# Patient Record
Sex: FEMALE | Race: WHITE | NOT HISPANIC OR LATINO | Employment: FULL TIME | URBAN - METROPOLITAN AREA
[De-identification: names, ages, dates, MRNs, and addresses within clinical notes are randomized per-mention and may not be internally consistent; named-entity substitution may affect disease eponyms.]

---

## 2017-06-02 ENCOUNTER — HOSPITAL ENCOUNTER (EMERGENCY)
Facility: HOSPITAL | Age: 52
Discharge: HOME | End: 2017-06-02
Attending: EMERGENCY MEDICINE | Admitting: EMERGENCY MEDICINE

## 2017-06-02 ENCOUNTER — APPOINTMENT (OUTPATIENT)
Dept: GENERAL RADIOLOGY | Facility: HOSPITAL | Age: 52
End: 2017-06-02

## 2017-06-02 VITALS
DIASTOLIC BLOOD PRESSURE: 88 MMHG | HEIGHT: 68 IN | OXYGEN SATURATION: 98 % | TEMPERATURE: 98.3 F | SYSTOLIC BLOOD PRESSURE: 132 MMHG | HEART RATE: 88 BPM | BODY MASS INDEX: 22.73 KG/M2 | RESPIRATION RATE: 20 BRPM | WEIGHT: 150 LBS

## 2017-06-02 DIAGNOSIS — T14.8XXA ABRASION: ICD-10-CM
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
## 2017-06-02 DIAGNOSIS — S20.211A CHEST WALL CONTUSION, RIGHT, INITIAL ENCOUNTER: Primary | ICD-10-CM

## 2017-06-02 PROCEDURE — 99283 EMERGENCY DEPT VISIT LOW MDM: CPT

## 2017-06-02 PROCEDURE — 71020 HC CHEST PA AND LATERAL: CPT

## 2017-06-02 RX ORDER — ONDANSETRON 4 MG/1
4 TABLET, ORALLY DISINTEGRATING ORAL ONCE
Status: COMPLETED | OUTPATIENT
Start: 2017-06-02 | End: 2017-06-02

## 2017-06-02 RX ORDER — HYDROCODONE BITARTRATE AND ACETAMINOPHEN 7.5; 325 MG/1; MG/1
1 TABLET ORAL ONCE
Status: COMPLETED | OUTPATIENT
Start: 2017-06-02 | End: 2017-06-02

## 2017-06-02 RX ORDER — HYDROCODONE BITARTRATE AND ACETAMINOPHEN 5; 325 MG/1; MG/1
1-2 TABLET ORAL EVERY 6 HOURS PRN
Qty: 15 TABLET | Refills: 0 | Status: SHIPPED | OUTPATIENT
Start: 2017-06-02 | End: 2021-11-22

## 2017-06-02 RX ORDER — ONDANSETRON 4 MG/1
4 TABLET, FILM COATED ORAL EVERY 6 HOURS PRN
Qty: 12 TABLET | Refills: 0 | Status: SHIPPED | OUTPATIENT
Start: 2017-06-02 | End: 2021-11-22

## 2017-06-02 RX ADMIN — ONDANSETRON 4 MG: 4 TABLET, ORALLY DISINTEGRATING ORAL at 19:26

## 2017-06-02 RX ADMIN — HYDROCODONE BITARTRATE AND ACETAMINOPHEN 1 TABLET: 7.5; 325 TABLET ORAL at 19:25

## 2017-06-02 NOTE — ED PROVIDER NOTES
Subjective   HPI Comments: Trip and fall today. Landed on right shoulder / back    Patient is a 51 y.o. female presenting with chest pain.   Chest Pain   Pain quality: aching    Pain radiates to:  Does not radiate  Onset quality:  Gradual  Timing:  Constant  Progression:  Improving  Chronicity:  New  Context: breathing and movement    Worsened by:  Movement  Associated symptoms: no abdominal pain, no cough, no dizziness, no fever, no lower extremity edema and no shortness of breath        Review of Systems   Constitutional: Negative for fever.   Respiratory: Negative for cough and shortness of breath.    Cardiovascular: Positive for chest pain.   Gastrointestinal: Negative for abdominal pain.   Neurological: Negative for dizziness.   All other systems reviewed and are negative.      History reviewed. No pertinent past medical history.    No Known Allergies    Past Surgical History:   Procedure Laterality Date   •  SECTION     • DENTAL PROCEDURE         History reviewed. No pertinent family history.    Social History     Social History   • Marital status:      Spouse name: N/A   • Number of children: N/A   • Years of education: N/A     Social History Main Topics   • Smoking status: Never Smoker   • Smokeless tobacco: None   • Alcohol use No   • Drug use: No   • Sexual activity: Defer     Other Topics Concern   • None     Social History Narrative   • None           Objective   Physical Exam   Constitutional: She is oriented to person, place, and time. She appears well-developed and well-nourished.   HENT:   Head: Normocephalic and atraumatic.   Right Ear: External ear normal.   Left Ear: External ear normal.   Nose: Nose normal.   Mouth/Throat: Oropharynx is clear and moist.   Eyes: Conjunctivae and EOM are normal. Pupils are equal, round, and reactive to light.   Neck: Normal range of motion. Neck supple.   Cardiovascular: Normal rate, regular rhythm, normal heart sounds and intact distal pulses.     Pulmonary/Chest: Effort normal and breath sounds normal.       Abdominal: Soft. Bowel sounds are normal.   Musculoskeletal: Normal range of motion.   Neurological: She is alert and oriented to person, place, and time.   Skin: Skin is warm and dry.   Psychiatric: She has a normal mood and affect. Her behavior is normal. Judgment and thought content normal.       Procedures         ED Course  ED Course   Comment By Time   Well aware of the ss of worsening condition. All thankful and agreeable. Reviewed CXR with pt and family. MARIANO Vaz 06/02 1904                  [facility]    Final diagnoses:   Chest wall contusion, right, initial encounter   Abrasion   Fall, initial encounter            MARIANO Vaz  06/02/17 1907       MARIANO Vaz  06/02/17 1911

## 2021-11-22 ENCOUNTER — OFFICE VISIT (OUTPATIENT)
Dept: FAMILY MEDICINE CLINIC | Facility: CLINIC | Age: 56
End: 2021-11-22

## 2021-11-22 VITALS
DIASTOLIC BLOOD PRESSURE: 78 MMHG | BODY MASS INDEX: 24.93 KG/M2 | HEART RATE: 69 BPM | WEIGHT: 164.5 LBS | HEIGHT: 68 IN | SYSTOLIC BLOOD PRESSURE: 124 MMHG | OXYGEN SATURATION: 98 %

## 2021-11-22 DIAGNOSIS — F41.9 ANXIETY: ICD-10-CM

## 2021-11-22 DIAGNOSIS — M79.671 CHRONIC FOOT PAIN, RIGHT: ICD-10-CM

## 2021-11-22 DIAGNOSIS — Z78.0 MENOPAUSE: ICD-10-CM

## 2021-11-22 DIAGNOSIS — Z00.00 HEALTHCARE MAINTENANCE: ICD-10-CM

## 2021-11-22 DIAGNOSIS — Z11.59 ENCOUNTER FOR HEPATITIS C SCREENING TEST FOR LOW RISK PATIENT: ICD-10-CM

## 2021-11-22 DIAGNOSIS — Z12.11 COLON CANCER SCREENING: ICD-10-CM

## 2021-11-22 DIAGNOSIS — E55.9 VITAMIN D DEFICIENCY: ICD-10-CM

## 2021-11-22 DIAGNOSIS — M21.612 BILATERAL BUNIONS: Primary | ICD-10-CM

## 2021-11-22 DIAGNOSIS — G89.29 CHRONIC FOOT PAIN, RIGHT: ICD-10-CM

## 2021-11-22 DIAGNOSIS — Z12.31 ENCOUNTER FOR SCREENING MAMMOGRAM FOR MALIGNANT NEOPLASM OF BREAST: ICD-10-CM

## 2021-11-22 DIAGNOSIS — M21.611 BILATERAL BUNIONS: Primary | ICD-10-CM

## 2021-11-22 PROCEDURE — 99203 OFFICE O/P NEW LOW 30 MIN: CPT | Performed by: NURSE PRACTITIONER

## 2021-11-22 NOTE — PROGRESS NOTES
"Subjective   Jocelyn Yoon is a 56 y.o. female here to establish care.  Chief Complaint   Patient presents with   • Bunions     Bilateral. Pt states has been having some issues with her bunions on her feet and is wanting to see if she can be referred to a poditary office       History of Present Illness   Patient is here to establish care; complaint of bunions on both great toes; has had for years; right is worse than left, also pain on dorsal mid foot right  Patient has not had a colonoscopy, is willing to do the cologuard  Sees Dr. Pitts for hormone therapy, she used to be patient's PCP, but no longer doing primary care  Both parents had dementia, mother still living; diagnosed a few years ago. Patient denies memory problems, but does have concerns due to parents' history of dementia  The following portions of the patient's history were reviewed and updated as appropriate: allergies, current medications, past family history, past medical history, past social history, past surgical history and problem list.    Review of Systems   Constitutional: Negative for chills, diaphoresis, fatigue, fever and unexpected weight change.   Eyes: Negative for visual disturbance.   Respiratory: Negative for cough, shortness of breath and wheezing.    Cardiovascular: Negative for chest pain, palpitations and leg swelling.   Gastrointestinal: Negative for abdominal pain, blood in stool, constipation and diarrhea.   Genitourinary: Negative for difficulty urinating, dysuria and vaginal bleeding.   Musculoskeletal: Negative for arthralgias and back pain.   Skin: Negative for pallor, rash and wound.   Neurological: Positive for headaches. Negative for dizziness, light-headedness and numbness.   Psychiatric/Behavioral: Negative for dysphoric mood, self-injury, sleep disturbance and suicidal ideas. The patient is nervous/anxious (controlled).      Blood pressure 124/78, pulse 69, height 173.2 cm (68.19\"), weight 74.6 kg (164 lb 8 oz), SpO2 " 98 %.    No Known Allergies  Past Medical History:   Diagnosis Date   • Anxiety    • Bunion    • History of recurrent UTIs      Past Surgical History:   Procedure Laterality Date   •  SECTION     • WISDOM TOOTH EXTRACTION       Family History   Problem Relation Age of Onset   • Arthritis Mother    • Hypertension Mother    • Dementia Mother    • Dementia Father    • Prostate cancer Father    • Heart disease Maternal Grandfather    • Heart disease Paternal Grandfather      Social History     Socioeconomic History   • Marital status:    Tobacco Use   • Smoking status: Never Smoker   • Smokeless tobacco: Never Used   Substance and Sexual Activity   • Alcohol use: No   • Drug use: No   • Sexual activity: Yes     Partners: Male     Birth control/protection: Post-menopausal     Immunization History   Administered Date(s) Administered   • COVID-19 (PFIZER) 2021       Current Outpatient Medications:   •  Progesterone Micronized (PROGESTERONE COMPOUNDING KIT TD), Place  on the skin as directed by provider. Progesterone DHEA Pregnenolone Bi-Est  50/15/5/0.5 ml cream, Disp: , Rfl:   •  sertraline (ZOLOFT) 50 MG tablet, Take 50 mg by mouth Daily., Disp: , Rfl:     Objective   Physical Exam  Vitals reviewed.   Constitutional:       General: She is not in acute distress.     Appearance: Normal appearance. She is not ill-appearing, toxic-appearing or diaphoretic.   HENT:      Head: Normocephalic and atraumatic.      Right Ear: Tympanic membrane normal.      Left Ear: Tympanic membrane normal.   Neck:      Vascular: No carotid bruit.   Cardiovascular:      Rate and Rhythm: Normal rate and regular rhythm.      Pulses: Normal pulses.           Dorsalis pedis pulses are 2+ on the right side and 2+ on the left side.        Posterior tibial pulses are 2+ on the right side and 2+ on the left side.      Heart sounds: Normal heart sounds. No murmur heard.      Pulmonary:      Effort: Pulmonary effort is normal.       Breath sounds: Normal breath sounds.   Musculoskeletal:      Right lower leg: No edema.      Left lower leg: No edema.      Right foot: Bunion present.      Left foot: Bunion present.   Neurological:      Mental Status: She is alert and oriented to person, place, and time.   Psychiatric:         Mood and Affect: Mood normal.         Thought Content: Thought content normal.         Assessment/Plan   Diagnoses and all orders for this visit:    1. Bilateral bunions (Primary)  -     Ambulatory Referral to Podiatry    2. Chronic foot pain, right  -     Ambulatory Referral to Podiatry    3. Healthcare maintenance  -     CBC Auto Differential; Future  -     Comprehensive Metabolic Panel; Future  -     Lipid Panel; Future  -     TSH Rfx On Abnormal To Free T4; Future  -     Urinalysis With Culture If Indicated - Urine, Clean Catch; Future    4. Encounter for screening mammogram for malignant neoplasm of breast  -     Mammo Screening Digital Tomosynthesis Bilateral With CAD; Future    5. Vitamin D deficiency  -     Vitamin D 25 Hydroxy; Future    6. Colon cancer screening  -     Cologuard - Stool, Per Rectum; Future    7. Encounter for hepatitis C screening test for low risk patient  -     Hepatitis C Antibody; Future    8. Menopause  -     DEXA Bone Density Axial; Future    9. Anxiety      Screening labs ordered to evaluate chronic conditions. I will contact patient regarding test results and provide instructions regarding any necessary changes in plan of care.  Referred to podiatry for evaluation of bunions  Anxiety is controlled, continue sertraline  Patient was encouraged to keep me informed of any acute changes, lack of improvement, or any new concerning symptoms.      No orders of the defined types were placed in this encounter.

## 2021-11-22 NOTE — PATIENT INSTRUCTIONS
Bunion  A bunion (hallux valgus) is a bump that forms slowly on the inner side of the big toe joint. It occurs when the big toe turns toward the second toe. Bunions may be small at first, but they often get larger over time. They can make walking painful.  What are the causes?  This condition may be caused by:  · Wearing narrow or pointed shoes that force the big toe to press against the other toes.  · Abnormal foot development that causes the foot to roll inward.  · Changes in the foot that are caused by certain diseases, such as rheumatoid arthritis or polio.  · A foot injury.  What increases the risk?  The following factors may make you more likely to develop this condition:  · Wearing shoes that squeeze the toes together.  · Having certain diseases, such as:  ? Rheumatoid arthritis.  ? Polio.  ? Cerebral palsy.  · Having family members who have bunions.  · Being born with abnormally shaped feet (a foot deformity), such as flat feet or low arches.  · Doing activities that put a lot of pressure on the feet, such as ballet dancing.  What are the signs or symptoms?    The main symptom of this condition is a bump on your big toe that you can notice.  Other symptoms may include:  · Pain.  · Redness and inflammation around your big toe.  · Thick or hardened skin on your big toe or between your toes.  · Stiffness or loss of motion in your big toe.  · Trouble with walking.  How is this diagnosed?  This condition may be diagnosed based on your symptoms, medical history, and activities. You may also have tests and imaging, such as:  · X-rays. These allow your health care provider to check the position of the bones in your foot and look for damage to your joint. They also help your health care provider determine the severity of your bunion and the best way to treat it.  · Joint aspiration. In this test, a sample of fluid is removed from the toe joint. This test may be done if you are in a lot of pain. It helps rule out  diseases that cause painful swelling of the joints, such as arthritis or gout.  How is this treated?  Treatment depends on the severity of your symptoms. The goal of treatment is to relieve symptoms and prevent your bunion from getting worse. Your health care provider may recommend:  · Wearing shoes that have a wide toe box, or using bunion pads to cushion the affected area.  · Taping your toes together to keep them in a normal position.  · Placing a device inside your shoe (orthotic device) to help reduce pressure on your toe joint.  · Taking medicine to ease pain and inflammation.  · Putting ice or heat on the affected area.  · Doing stretching exercises.  · Surgery, for severe cases.  Follow these instructions at home:  Managing pain, stiffness, and swelling         · If directed, put ice on the painful area. To do this:  ? Put ice in a plastic bag.  ? Place a towel between your skin and the bag.  ? Leave the ice on for 20 minutes, 2-3 times a day.  ? Remove the ice if your skin turns bright red. This is very important. If you cannot feel pain, heat, or cold, you have a greater risk of damage to the area.  · If directed, apply heat to the affected area before you exercise. Use the heat source that your health care provider recommends, such as a moist heat pack or a heating pad.  ? Place a towel between your skin and the heat source.  ? Leave the heat on for 20-30 minutes.  ? Remove the heat if your skin turns bright red. This is especially important if you are unable to feel pain, heat, or cold. You have a greater risk of getting burned.  General instructions  · Do exercises as told by your health care provider.  · Support your toe joint with proper footwear, shoe padding, or taping as told by your health care provider.  · Take over-the-counter and prescription medicines only as told by your health care provider.  · Do not use any products that contain nicotine or tobacco, such as cigarettes, e-cigarettes, and  chewing tobacco. If you need help quitting, ask your health care provider.  · Keep all follow-up visits. This is important.  Contact a health care provider if:  · Your symptoms get worse.  · Your symptoms do not improve in 2 weeks.  Get help right away if:  · You have severe pain and trouble with walking.  Summary  · A bunion is a bump on the inner side of the big toe joint that forms when the big toe turns toward the second toe.  · Bunions can make walking painful.  · Treatment depends on the severity of your symptoms.  · Support your toe joint with proper footwear, shoe padding, or taping as told by your health care provider.  This information is not intended to replace advice given to you by your health care provider. Make sure you discuss any questions you have with your health care provider.  Document Revised: 04/23/2021 Document Reviewed: 04/23/2021  [employer] Patient Education © 2021 [employer]    Bunion Surgery    Bunion surgery is done to remove a bunion, which is a bump that forms slowly on the inner side of the big toe joint. A bunion can develop over time when pressure turns the big toe toward the second toe. Bunions may be caused by wearing shoes that are narrow or pointed. They can also be caused by certain conditions, such as rheumatoid arthritis and flat feet.  You may need bunion surgery if your bunion is very large or painful or it affects your ability to walk.  Tell a health care provider about:  · Any allergies you have.  · All medicines you are taking, including vitamins, herbs, eye drops, creams, and over-the-counter medicines.  · Any problems you or family members have had with anesthetic medicines.  · Any blood disorders you have.  · Any surgeries you have had.  · Any medical conditions you have.  · Whether you are pregnant or may be pregnant.  What are the risks?  Generally, this is a safe procedure. However, problems may occur, including:  · Infection.  · Bleeding or blood  clots.  · Allergic reactions to medicines.  · Nerve damage.  · Pain, numbness, stiffness, or arthritis in the toe.  · Return of the bunion.  · Failure of the bone to heal completely after surgery, or failure of the surgery to relieve symptoms.  What happens before the procedure?  Staying hydrated  Follow instructions from your health care provider about hydration, which may include:  · Up to 2 hours before the procedure - you may continue to drink clear liquids, such as water, clear fruit juice, black coffee, and plain tea.    Eating and drinking restrictions  · Follow instructions from your health care provider about eating and drinking, which may include:  ? 8 hours before the procedure - stop eating heavy meals or foods, such as meat, fried foods, or fatty foods.  ? 6 hours before the procedure - stop eating light meals or foods, such as toast or cereal.  ? 6 hours before the procedure - stop drinking milk or drinks that contain milk.  ? 2 hours before the procedure - stop drinking clear liquids.  · Do not drink alcohol for the length of time you are told by your health care provider.  Medicines  Ask your health care provider about:  · Changing or stopping your regular medicines. This is especially important if you are taking diabetes medicines or blood thinners.  · Taking medicines such as aspirin and ibuprofen. These medicines can thin your blood. Do not take these medicines unless your health care provider tells you to take them.  · Taking over-the-counter medicines, vitamins, herbs, and supplements.  General instructions  · Do not use any products that contain nicotine or tobacco for at least 4 weeks before the procedure. These products include cigarettes, e-cigarettes, and chewing tobacco. If you need help quitting, ask your health care provider.  · Plan to have a responsible adult take you home from the hospital or clinic.  · If you will be going home right after the procedure, plan to have a responsible  adult care for you for the time you are told. This is important.  · Ask your health care provider:  ? How your surgery site will be marked.  ? What steps will be taken to help prevent infection. These steps may include:  § Washing skin with a germ-killing soap.  § Taking antibiotic medicine.  What happens during the procedure?  · An IV will be inserted into one of your veins.  · You will be given one or more of the following:  ? A medicine to help you relax (sedative).  ? A medicine to numb the area (local anesthetic).  ? A medicine that is injected into an area of your body to numb everything below the injection site (regional anesthetic).  ? A medicine to make you fall asleep (general anesthetic).  · An incision will be made over the bump at the big toe joint. Depending on the type of procedure that is needed for your bunion, your surgeon may do one or more of the following:  ? Exostectomy. This is a procedure to remove the bunion.  ? Osteotomy. This is a procedure to cut and realign the damaged joint in your big toe.  ? Arthrodesis. For the procedure, hardware, such as screws, is used to keep your foot in the correct position.  ? Tightening or loosening tissues around the big toe to reposition the toe.  · The incision will be closed with stitches (sutures) and covered with adhesive strips or another type of bandage (dressing).  · A supportive device, such as a boot, may be placed on your foot. A boot is a cast that you can walk on.  The procedure may vary among health care providers and hospitals.  What happens after the procedure?  · Your blood pressure, heart rate, breathing rate, and blood oxygen level will be monitored until you leave the hospital or clinic.  · If you were given a sedative during the procedure, it can affect you for several hours. Do not drive or operate machinery until your health care provider says that it is safe.  · Ask your health care provider when it is safe to drive if you have a boot  or other supportive device on your foot.  · You may be given instructions about weight-bearing restrictions. These instructions tell you how much body weight you can or cannot support on your foot. You may be given crutches, a cane, or a walker to help you move around so that you do not put (bear) weight on your foot.  Summary  · Bunion surgery is done to remove a bunion, which is a bump that forms slowly on the inner side of the big toe joint.  · You may need bunion surgery if your bunion is very large or painful or it affects your ability to walk.  · Before the procedure, follow instructions from your health care provider about eating and drinking, and ask about changing or stopping your regular medicines.  This information is not intended to replace advice given to you by your health care provider. Make sure you discuss any questions you have with your health care provider.  Document Revised: 04/23/2021 Document Reviewed: 04/23/2021  [employer] Patient Education © 2021 [employer]    Health Maintenance for Postmenopausal Women  Menopause is a normal process in which your ability to get pregnant comes to an end. This process happens slowly over many months or years, usually between the ages of 48 and 55. Menopause is complete when you have missed your menstrual periods for 12 months.  It is important to talk with your health care provider about some of the most common conditions that affect women after menopause (postmenopausal women). These include heart disease, cancer, and bone loss (osteoporosis). Adopting a healthy lifestyle and getting preventive care can help to promote your health and wellness. The actions you take can also lower your chances of developing some of these common conditions.  What should I know about menopause?  During menopause, you may get a number of symptoms, such as:  · Hot flashes. These can be moderate or severe.  · Night sweats.  · Decrease in sex drive.  · Mood  swings.  · Headaches.  · Tiredness.  · Irritability.  · Memory problems.  · Insomnia.  Choosing to treat or not to treat these symptoms is a decision that you make with your health care provider.  Do I need hormone replacement therapy?  · Hormone replacement therapy is effective in treating symptoms that are caused by menopause, such as hot flashes and night sweats.  · Hormone replacement carries certain risks, especially as you become older. If you are thinking about using estrogen or estrogen with progestin, discuss the benefits and risks with your health care provider.  What is my risk for heart disease and stroke?  The risk of heart disease, heart attack, and stroke increases as you age. One of the causes may be a change in the body's hormones during menopause. This can affect how your body uses dietary fats, triglycerides, and cholesterol. Heart attack and stroke are medical emergencies. There are many things that you can do to help prevent heart disease and stroke.  Watch your blood pressure  · High blood pressure causes heart disease and increases the risk of stroke. This is more likely to develop in people who have high blood pressure readings, are of  descent, or are overweight.  · Have your blood pressure checked:  ? Every 3-5 years if you are 18-39 years of age.  ? Every year if you are 40 years old or older.  Eat a healthy diet    · Eat a diet that includes plenty of vegetables, fruits, low-fat dairy products, and lean protein.  · Do not eat a lot of foods that are high in solid fats, added sugars, or sodium.    Get regular exercise  Get regular exercise. This is one of the most important things you can do for your health. Most adults should:  · Try to exercise for at least 150 minutes each week. The exercise should increase your heart rate and make you sweat (moderate-intensity exercise).  · Try to do strengthening exercises at least twice each week. Do these in addition to the moderate-intensity  exercise.  · Spend less time sitting. Even light physical activity can be beneficial.  Other tips  · Work with your health care provider to achieve or maintain a healthy weight.  · Do not use any products that contain nicotine or tobacco, such as cigarettes, e-cigarettes, and chewing tobacco. If you need help quitting, ask your health care provider.  · Know your numbers. Ask your health care provider to check your cholesterol and your blood sugar (glucose). Continue to have your blood tested as directed by your health care provider.  Do I need screening for cancer?  Depending on your health history and family history, you may need to have cancer screening at different stages of your life. This may include screening for:  · Breast cancer.  · Cervical cancer.  · Lung cancer.  · Colorectal cancer.  What is my risk for osteoporosis?  After menopause, you may be at increased risk for osteoporosis. Osteoporosis is a condition in which bone destruction happens more quickly than new bone creation. To help prevent osteoporosis or the bone fractures that can happen because of osteoporosis, you may take the following actions:  · If you are 19-50 years old, get at least 1,000 mg of calcium and at least 600 mg of vitamin D per day.  · If you are older than age 50 but younger than age 70, get at least 1,200 mg of calcium and at least 600 mg of vitamin D per day.  · If you are older than age 70, get at least 1,200 mg of calcium and at least 800 mg of vitamin D per day.  Smoking and drinking excessive alcohol increase the risk of osteoporosis. Eat foods that are rich in calcium and vitamin D, and do weight-bearing exercises several times each week as directed by your health care provider.  How does menopause affect my mental health?  Depression may occur at any age, but it is more common as you become older. Common symptoms of depression include:  · Low or sad mood.  · Changes in sleep patterns.  · Changes in appetite or eating  "patterns.  · Feeling an overall lack of motivation or enjoyment of activities that you previously enjoyed.  · Frequent crying spells.  Talk with your health care provider if you think that you are experiencing depression.  General instructions  See your health care provider for regular wellness exams and vaccines. This may include:  · Scheduling regular health, dental, and eye exams.  · Getting and maintaining your vaccines. These include:  ? Influenza vaccine. Get this vaccine each year before the flu season begins.  ? Pneumonia vaccine.  ? Shingles vaccine.  ? Tetanus, diphtheria, and pertussis (Tdap) booster vaccine.  Your health care provider may also recommend other immunizations.  Tell your health care provider if you have ever been abused or do not feel safe at home.  Summary  · Menopause is a normal process in which your ability to get pregnant comes to an end.  · This condition causes hot flashes, night sweats, decreased interest in sex, mood swings, headaches, or lack of sleep.  · Treatment for this condition may include hormone replacement therapy.  · Take actions to keep yourself healthy, including exercising regularly, eating a healthy diet, watching your weight, and checking your blood pressure and blood sugar levels.  · Get screened for cancer and depression. Make sure that you are up to date with all your vaccines.  This information is not intended to replace advice given to you by your health care provider. Make sure you discuss any questions you have with your health care provider.  Document Revised: 12/11/2019 Document Reviewed: 12/11/2019  [employer] Patient Education © 2021 [employer]    https://www.cancer.org/cancer/colon-rectal-cancer/causes-risks-prevention/risk-factors.html\">   Colorectal Cancer Screening    Colorectal cancer screening is a group of tests that are used to check for colorectal cancer before symptoms develop. Colorectal refers to the colon and rectum. The colon and rectum are " located at the end of the digestive tract and carry stool (feces) out of the body.  Who should have screening?  All adults who are 45-75 years old should have screening. Your health care provider may recommend screening before age 45. You will have tests every 1-10 years, depending on your results and the type of screening test. Screening recommendations for adults who are 76-85 years old vary depending on a person's health. People older than age 85 should no longer get colorectal cancer screening.  You may have screening tests starting before age 45, or more often than other people, if you have any of these risk factors:  · A personal or family history of colorectal cancer or abnormal growths known as polyps in your colon.  · Inflammatory bowel disease, such as ulcerative colitis or Crohn's disease.  · A history of having radiation treatment to the abdomen or the area between the hip bones (pelvic area) for cancer.  · A type of genetic syndrome that is passed from parent to child (hereditary), such as:  ? Marques syndrome.  ? Familial adenomatous polyposis.  ? Turcot syndrome.  ? Peutz-Jeghers syndrome.  ? MUTYH-associated polyposis (MAP).  · A personal history of diabetes.  Types of tests  There are several types of colorectal screening tests. You may have one or more of the following:  · Guaiac-based fecal occult blood testing. For this test, a stool sample is checked for hidden (occult) blood, which could be a sign of colorectal cancer.  · Fecal immunochemical test (FIT). For this test, a stool sample is checked for blood, which could be a sign of colorectal cancer.  · Stool DNA test. For this test, a stool sample is checked for blood and changes in DNA that could lead to colorectal cancer.  · Sigmoidoscopy. During this test, a thin, flexible tube with a camera on the end, called a sigmoidoscope, is used to examine the rectum and the lower colon.  · Colonoscopy. During this test, a long, flexible tube with a  camera on the end, called a colonoscope, is used to examine the entire colon and rectum. Also, sometimes a tissue sample is taken to be looked at under a microscope (biopsy) or small polyps are removed during this test.  · Virtual colonoscopy. Instead of a colonoscope, this type of colonoscopy uses a CT scan to take pictures of the colon and rectum. A CT scan is a type of X-ray that is made using computers.  What are the benefits of screening?  Screening reduces your risk for colorectal cancer and can help identify cancer at an early stage, when the cancer can be removed or treated more easily. It is common for polyps to form in the lining of the colon, especially as you age. These polyps may be cancerous or become cancerous over time. Screening can identify these polyps.  What are the risks of screening?  Generally, these are safe tests. However, problems may occur, including:  · The need for more tests to confirm results from a stool sample test. Stool sample tests have fewer risks than other types of screening tests.  · Being exposed to low levels of radiation, if you had a test involving X-rays. This may slightly increase your cancer risk. The benefit of detecting cancer outweighs the slight increase in risk.  · Bleeding, damage to the intestine, or infection caused by a sigmoidoscopy or colonoscopy.  · A reaction to medicines given during a sigmoidoscopy or colonoscopy.  Talk with your health care provider to understand your risk for colorectal cancer and to make a screening plan that is right for you.  Questions to ask your health care provider  · When should I start colorectal cancer screening?  · What is my risk for colorectal cancer?  · How often do I need screening?  · Which screening tests do I need?  · How do I get my test results?  · What do my results mean?  Where to find more information  Learn more about colorectal cancer screening from:  · The American Cancer Society: cancer.org  · National Cancer  [place]: cancer.gov  Summary  · Colorectal cancer screening is a group of tests used to check for colorectal cancer before symptoms develop.  · All adults who are 45-75 years old should have screening. Your health care provider may recommend screening before age 45.  · You may have screening tests starting before age 45, or more often than other people, if you have certain risk factors.  · Screening reduces your risk for colorectal cancer and can help identify cancer at an early stage, when the cancer can be removed or treated more easily.  · Talk with your health care provider to understand your risk for colorectal cancer and to make a screening plan that is right for you.  This information is not intended to replace advice given to you by your health care provider. Make sure you discuss any questions you have with your health care provider.  Document Revised: 04/07/2021 Document Reviewed: 04/07/2021  [employer] Patient Education © 2021 [employer]    Colonoscopy, Adult  A colonoscopy is an exam to look at the large intestine. It is done using a long, thin, flexible tube that has a camera on the end.  This exam is done to check for problems, such as:  · Lumps (tumors).  · Growths (polyps).  · Irritation and swelling (inflammation).  · Bleeding.  Tell your doctor about:  · Any allergies you have.  · All medicines you are taking. Tell him or her about vitamins, herbs, eye drops, creams, and over-the-counter medicines.  · Any problems you or family members have had with anesthetic medicines.  · Any blood disorders you have.  · Any surgeries you have had.  · Any medical conditions you have.  · Whether you are pregnant or may be pregnant.  · Any problems you have had with pooping (having bowel movements).  What are the risks?  Generally, this is a safe procedure. However, problems may occur, such as:  · Bleeding.  · Damage to your intestine.  · Allergic reactions to medicines given during the procedure.  · Infection.  This is rare.  What happens before the procedure?  Eating and drinking  Follow instructions from your doctor about eating and drinking. These may include:  · A few days before the procedure:  ? Follow a low-fiber diet.  ? Avoid these foods:  § Nuts.  § Seeds.  § Dried fruit.  § Raw fruits.  § Vegetables.  · 1-3 days before the procedure:  ? Eat only gelatin dessert or ice pops.  ? Drink only clear liquids, such as:  § Water.  § Clear broth or bouillon.  § Black coffee or tea.  § Clear juice.  § Clear soft drinks or sports drinks.  ? Avoid liquids that have red or purple dye.  · On the day of the procedure:  ? Do not eat solid foods. You may continue to drink clear liquids until up to 2 hours before the procedure.  ? Do not eat or drink anything starting 2 hours before the procedure or as told by your doctor.  Bowel prep  If you were prescribed a bowel prep to take by mouth (orally) to clean out your colon:  · Take it as told by your doctor. Starting the day before your procedure, you will need to drink a lot of liquid medicine. The liquid will cause you to poop until your poop is almost clear or light green.  · If your skin or butt gets irritated from diarrhea, you may:  ? Wipe the area with wipes that have medicine in them, such as adult wet wipes with aloe and vitamin E.  ? Put something on your skin that soothes the area, such as petroleum jelly.  · If you vomit while drinking the bowel prep:  ? Take a break for up to 60 minutes.  ? Begin the bowel prep again.  ? Call your doctor if you keep vomiting and you cannot take the bowel prep without vomiting.  · To clean out your colon, you may also be given:  ? Laxative medicines. These help you poop.  ? Instructions for using a liquid medicine (enema) injected into your butt.  Medicines  Ask your doctor about:  · Changing or stopping your normal medicines. This is important.  · Taking aspirin and ibuprofen. Do not take these medicines unless your doctor tells you to  take them.  · Taking over-the-counter medicines, vitamins, herbs, and supplements.  General instructions  · Ask your doctor what steps will be taken to help prevent the spread of germs. These may include washing skin with a germ-killing soap.  · Plan to have someone take you home from the hospital or clinic.  What happens during the procedure?    · An IV tube may be put into one of your veins.  · You may be given one or more of the following:  ? A medicine to help you relax (sedative).  ? A medicine to numb the area (local anesthetic).  ? A medicine to make you fall asleep (general anesthetic). This is rarely needed.  · You will lie on your side with your knees bent.  · The tube will:  ? Have oil or gel put on it.  ? Be put into the opening of the butt (anus).  ? Be gently put into your large intestine.  · Air will be sent into your colon to keep it open. You may feel some pressure or cramping.  · The camera will be used to take photos that will appear on a screen.  · A small tissue sample may be removed for testing (biopsy).  · If small growths are found, your doctor may remove them and have them checked for cancer.  · The tube will be slowly removed.  The procedure may vary among doctors and hospitals.  What happens after the procedure?  · You will be monitored until you leave the hospital or clinic. This includes checking your:  ? Blood pressure.  ? Heart rate.  ? Breathing rate.  ? Blood oxygen level.  · You may have a small amount of blood in your poop.  · You may pass gas.  · You may have mild cramps or bloating in your belly (abdomen).  · Do not drive for 24 hours after the procedure.  · It is up to you to get the results of your procedure. Ask your doctor, or the department that is doing the procedure, when your results will be ready.  Summary  · A colonoscopy is an exam to look at the large intestine.  · Follow instructions from your doctor about eating and drinking before the procedure.  · You may be  prescribed an oral bowel prep to clean out your colon. Take it as told by your doctor.  · A flexible tube with a camera on its end will be put into the opening of the butt. It will be passed into the large intestine.  · You will be monitored until you leave the hospital or clinic.  This information is not intended to replace advice given to you by your health care provider. Make sure you discuss any questions you have with your health care provider.  Document Revised: 07/10/2020 Document Reviewed: 07/10/2020  [employer] Patient Education © 2021 [employer]

## 2021-11-24 ENCOUNTER — PATIENT ROUNDING (BHMG ONLY) (OUTPATIENT)
Dept: FAMILY MEDICINE CLINIC | Facility: CLINIC | Age: 56
End: 2021-11-24

## 2021-11-24 NOTE — PROGRESS NOTES
Called [phone]. Spoke with Jocelyn Yoon. Welcomed her to the practice and inquired about her visit on 11/22/21. Jocelyn states that her visit was great overall. She reports that she likes [facility] and feels like [facility]mi spent sufficient time with her and made her feel valued. Mrs. Yoon states that all of the staff that she came in contact with was extremely nice and considerate to her. Jocelyn has no questions or concerns about her appointment. She has already been contacted about the referrals that were placed for her. Mrs. Yoon was appreciative of the phone call. I advised her to contact me directly if she has any questions or concerns in the future. Thanked Jocelyn for taking the time to speak with me today.

## 2021-12-21 ENCOUNTER — LAB (OUTPATIENT)
Dept: LAB | Facility: HOSPITAL | Age: 56
End: 2021-12-21

## 2021-12-21 DIAGNOSIS — Z11.59 ENCOUNTER FOR HEPATITIS C SCREENING TEST FOR LOW RISK PATIENT: ICD-10-CM

## 2021-12-21 DIAGNOSIS — E55.9 VITAMIN D DEFICIENCY: ICD-10-CM

## 2021-12-21 DIAGNOSIS — Z00.00 HEALTHCARE MAINTENANCE: ICD-10-CM

## 2021-12-21 LAB
25(OH)D3 SERPL-MCNC: 31.2 NG/ML (ref 30–100)
ALBUMIN SERPL-MCNC: 4.4 G/DL (ref 3.5–5.2)
ALBUMIN/GLOB SERPL: 1.9 G/DL
ALP SERPL-CCNC: 53 U/L (ref 39–117)
ALT SERPL W P-5'-P-CCNC: 14 U/L (ref 1–33)
ANION GAP SERPL CALCULATED.3IONS-SCNC: 7 MMOL/L (ref 5–15)
AST SERPL-CCNC: 15 U/L (ref 1–32)
BASOPHILS # BLD AUTO: 0.05 10*3/MM3 (ref 0–0.2)
BASOPHILS NFR BLD AUTO: 1.2 % (ref 0–1.5)
BILIRUB SERPL-MCNC: 0.3 MG/DL (ref 0–1.2)
BILIRUB UR QL STRIP: NEGATIVE
BUN SERPL-MCNC: 14 MG/DL (ref 6–20)
BUN/CREAT SERPL: 16.9 (ref 7–25)
CALCIUM SPEC-SCNC: 9.2 MG/DL (ref 8.6–10.5)
CHLORIDE SERPL-SCNC: 105 MMOL/L (ref 98–107)
CHOLEST SERPL-MCNC: 259 MG/DL (ref 0–200)
CLARITY UR: CLEAR
CO2 SERPL-SCNC: 27 MMOL/L (ref 22–29)
COLOR UR: YELLOW
CREAT SERPL-MCNC: 0.83 MG/DL (ref 0.57–1)
DEPRECATED RDW RBC AUTO: 41.3 FL (ref 37–54)
EOSINOPHIL # BLD AUTO: 0.08 10*3/MM3 (ref 0–0.4)
EOSINOPHIL NFR BLD AUTO: 1.9 % (ref 0.3–6.2)
ERYTHROCYTE [DISTWIDTH] IN BLOOD BY AUTOMATED COUNT: 12.4 % (ref 12.3–15.4)
GFR SERPL CREATININE-BSD FRML MDRD: 72 ML/MIN/1.73
GLOBULIN UR ELPH-MCNC: 2.3 GM/DL
GLUCOSE SERPL-MCNC: 82 MG/DL (ref 65–99)
GLUCOSE UR STRIP-MCNC: NEGATIVE MG/DL
HCT VFR BLD AUTO: 39.1 % (ref 34–46.6)
HDLC SERPL-MCNC: 78 MG/DL (ref 40–60)
HGB BLD-MCNC: 12.9 G/DL (ref 12–15.9)
HGB UR QL STRIP.AUTO: NEGATIVE
IMM GRANULOCYTES # BLD AUTO: 0.01 10*3/MM3 (ref 0–0.05)
IMM GRANULOCYTES NFR BLD AUTO: 0.2 % (ref 0–0.5)
KETONES UR QL STRIP: NEGATIVE
LDLC SERPL CALC-MCNC: 168 MG/DL (ref 0–100)
LDLC/HDLC SERPL: 2.11 {RATIO}
LEUKOCYTE ESTERASE UR QL STRIP.AUTO: NEGATIVE
LYMPHOCYTES # BLD AUTO: 1.35 10*3/MM3 (ref 0.7–3.1)
LYMPHOCYTES NFR BLD AUTO: 32.2 % (ref 19.6–45.3)
MCH RBC QN AUTO: 29.7 PG (ref 26.6–33)
MCHC RBC AUTO-ENTMCNC: 33 G/DL (ref 31.5–35.7)
MCV RBC AUTO: 89.9 FL (ref 79–97)
MONOCYTES # BLD AUTO: 0.34 10*3/MM3 (ref 0.1–0.9)
MONOCYTES NFR BLD AUTO: 8.1 % (ref 5–12)
NEUTROPHILS NFR BLD AUTO: 2.36 10*3/MM3 (ref 1.7–7)
NEUTROPHILS NFR BLD AUTO: 56.4 % (ref 42.7–76)
NITRITE UR QL STRIP: NEGATIVE
NRBC BLD AUTO-RTO: 0 /100 WBC (ref 0–0.2)
PH UR STRIP.AUTO: 7 [PH] (ref 5–8)
PLATELET # BLD AUTO: 212 10*3/MM3 (ref 140–450)
PMV BLD AUTO: 10.8 FL (ref 6–12)
POTASSIUM SERPL-SCNC: 4.2 MMOL/L (ref 3.5–5.2)
PROT SERPL-MCNC: 6.7 G/DL (ref 6–8.5)
PROT UR QL STRIP: NEGATIVE
RBC # BLD AUTO: 4.35 10*6/MM3 (ref 3.77–5.28)
SODIUM SERPL-SCNC: 139 MMOL/L (ref 136–145)
SP GR UR STRIP: 1.02 (ref 1–1.03)
TRIGL SERPL-MCNC: 81 MG/DL (ref 0–150)
TSH SERPL DL<=0.05 MIU/L-ACNC: 3.14 UIU/ML (ref 0.27–4.2)
UROBILINOGEN UR QL STRIP: NORMAL
VLDLC SERPL-MCNC: 13 MG/DL (ref 5–40)
WBC NRBC COR # BLD: 4.19 10*3/MM3 (ref 3.4–10.8)

## 2021-12-21 PROCEDURE — 84443 ASSAY THYROID STIM HORMONE: CPT

## 2021-12-21 PROCEDURE — 86803 HEPATITIS C AB TEST: CPT

## 2021-12-21 PROCEDURE — 81003 URINALYSIS AUTO W/O SCOPE: CPT

## 2021-12-21 PROCEDURE — 85025 COMPLETE CBC W/AUTO DIFF WBC: CPT

## 2021-12-21 PROCEDURE — 80061 LIPID PANEL: CPT

## 2021-12-21 PROCEDURE — 80053 COMPREHEN METABOLIC PANEL: CPT

## 2021-12-21 PROCEDURE — 82306 VITAMIN D 25 HYDROXY: CPT

## 2021-12-22 LAB — HCV AB SER DONR QL: NORMAL

## 2022-01-31 ENCOUNTER — TELEMEDICINE (OUTPATIENT)
Dept: FAMILY MEDICINE CLINIC | Facility: CLINIC | Age: 57
End: 2022-01-31

## 2022-01-31 DIAGNOSIS — J01.00 ACUTE NON-RECURRENT MAXILLARY SINUSITIS: Primary | ICD-10-CM

## 2022-01-31 PROCEDURE — 99213 OFFICE O/P EST LOW 20 MIN: CPT | Performed by: NURSE PRACTITIONER

## 2022-01-31 RX ORDER — AMOXICILLIN AND CLAVULANATE POTASSIUM 875; 125 MG/1; MG/1
1 TABLET, FILM COATED ORAL EVERY 12 HOURS SCHEDULED
Qty: 20 TABLET | Refills: 0 | Status: SHIPPED | OUTPATIENT
Start: 2022-01-31 | End: 2022-02-10

## 2022-01-31 RX ORDER — FLUTICASONE PROPIONATE 50 MCG
2 SPRAY, SUSPENSION (ML) NASAL DAILY
Qty: 16 G | Refills: 11 | Status: SHIPPED | OUTPATIENT
Start: 2022-01-31

## 2022-01-31 NOTE — PROGRESS NOTES
"Subjective   Jocelyn Yoon is a 56 y.o. female.   Chief Complaint   Patient presents with   • Sinusitis   You have chosen to receive care through a telehealth visit.  Do you consent to use a video/audio connection for your medical care today? Yes      History of Present Illness   Went to  yesterday off [employer]; had flu, strep, and covid tests;all negative; was not prescribed meds  Started feeling worse yesterday; had some minor symptoms a week or so ago; seemed to get better, now worse again; has sinus pressure, low grade fever and chills; \"lots of drainage\". Has taken tylenol and ibuprofen for fever, no other meds.  Patient is a , so may have had sick exposures  The following portions of the patient's history were reviewed and updated as appropriate: allergies, current medications, past family history, past medical history, past social history, past surgical history and problem list.    Review of Systems   Constitutional: Positive for chills and fever (taking tylenol and ibuprofen). Negative for fatigue.   HENT: Positive for congestion, postnasal drip, sinus pressure and sore throat. Negative for sneezing.    Eyes: Negative for discharge and itching.   Respiratory: Positive for cough (PND). Negative for shortness of breath and wheezing.    Gastrointestinal: Positive for diarrhea (yesterday), nausea and vomiting (yesterday).   Musculoskeletal:        Body aches yesterday   Neurological: Positive for headaches. Negative for dizziness and light-headedness.       Objective   Physical Exam  Constitutional:       General: She is not in acute distress.     Appearance: Normal appearance. She is not ill-appearing, toxic-appearing or diaphoretic.   HENT:      Head: Normocephalic and atraumatic.      Nose:      Right Sinus: Maxillary sinus tenderness present. No frontal sinus tenderness.      Left Sinus: Maxillary sinus tenderness present. No frontal sinus tenderness.   Pulmonary:      Effort: Pulmonary effort " is normal. No respiratory distress.   Neurological:      Mental Status: She is alert and oriented to person, place, and time.   Psychiatric:         Mood and Affect: Mood normal.         Thought Content: Thought content normal.         Judgment: Judgment normal.       There were no vitals taken for this visit.    Assessment/Plan   Diagnoses and all orders for this visit:    1. Acute non-recurrent maxillary sinusitis (Primary)  -     amoxicillin-clavulanate (AUGMENTIN) 875-125 MG per tablet; Take 1 tablet by mouth Every 12 (Twelve) Hours for 10 days.  Dispense: 20 tablet; Refill: 0  -     fluticasone (Flonase) 50 MCG/ACT nasal spray; 2 sprays into the nostril(s) as directed by provider Daily.  Dispense: 16 g; Refill: 11        Augmentin prescribed for sinusitis; Advised daily antihistamine such as Claritin or Zyrtec, along with daily Flonase to reduce drainage and allergy symptoms.  Information on sinus rinses also provided in hand out.  If symptoms worsen, recommend repeat covid testing  Patient was encouraged to keep me informed of any acute changes, lack of improvement, or any new concerning symptoms.

## 2022-01-31 NOTE — PATIENT INSTRUCTIONS
Sinusitis, Adult  Sinusitis is soreness and swelling (inflammation) of your sinuses. Sinuses are hollow spaces in the bones around your face. They are located:  · Around your eyes.  · In the middle of your forehead.  · Behind your nose.  · In your cheekbones.  Your sinuses and nasal passages are lined with a fluid called mucus. Mucus drains out of your sinuses. Swelling can trap mucus in your sinuses. This lets germs (bacteria, virus, or fungus) grow, which leads to infection. Most of the time, this condition is caused by a virus.  What are the causes?  This condition is caused by:  · Allergies.  · Asthma.  · Germs.  · Things that block your nose or sinuses.  · Growths in the nose (nasal polyps).  · Chemicals or irritants in the air.  · Fungus (rare).  What increases the risk?  You are more likely to develop this condition if:  · You have a weak body defense system (immune system).  · You do a lot of swimming or diving.  · You use nasal sprays too much.  · You smoke.  What are the signs or symptoms?  The main symptoms of this condition are pain and a feeling of pressure around the sinuses. Other symptoms include:  · Stuffy nose (congestion).  · Runny nose (drainage).  · Swelling and warmth in the sinuses.  · Headache.  · Toothache.  · A cough that may get worse at night.  · Mucus that collects in the throat or the back of the nose (postnasal drip).  · Being unable to smell and taste.  · Being very tired (fatigue).  · A fever.  · Sore throat.  · Bad breath.  How is this diagnosed?  This condition is diagnosed based on:  · Your symptoms.  · Your medical history.  · A physical exam.  · Tests to find out if your condition is short-term (acute) or long-term (chronic). Your doctor may:  ? Check your nose for growths (polyps).  ? Check your sinuses using a tool that has a light (endoscope).  ? Check for allergies or germs.  ? Do imaging tests, such as an MRI or CT scan.  How is this treated?  Treatment for this condition  depends on the cause and whether it is short-term or long-term.  · If caused by a virus, your symptoms should go away on their own within 10 days. You may be given medicines to relieve symptoms. They include:  ? Medicines that shrink swollen tissue in the nose.  ? Medicines that treat allergies (antihistamines).  ? A spray that treats swelling of the nostrils.   ? Rinses that help get rid of thick mucus in your nose (nasal saline washes).  · If caused by bacteria, your doctor may wait to see if you will get better without treatment. You may be given antibiotic medicine if you have:  ? A very bad infection.  ? A weak body defense system.  · If caused by growths in the nose, you may need to have surgery.  Follow these instructions at home:  Medicines  · Take, use, or apply over-the-counter and prescription medicines only as told by your doctor. These may include nasal sprays.  · If you were prescribed an antibiotic medicine, take it as told by your doctor. Do not stop taking the antibiotic even if you start to feel better.  Hydrate and humidify    · Drink enough water to keep your pee (urine) pale yellow.  · Use a cool mist humidifier to keep the humidity level in your home above 50%.  · Breathe in steam for 10-15 minutes, 3-4 times a day, or as told by your doctor. You can do this in the bathroom while a hot shower is running.  · Try not to spend time in cool or dry air.    Rest  · Rest as much as you can.  · Sleep with your head raised (elevated).  · Make sure you get enough sleep each night.  General instructions    · Put a warm, moist washcloth on your face 3-4 times a day, or as often as told by your doctor. This will help with discomfort.  · Wash your hands often with soap and water. If there is no soap and water, use hand .  · Do not smoke. Avoid being around people who are smoking (secondhand smoke).  · Keep all follow-up visits as told by your doctor. This is important.    Contact a doctor if:  · You  have a fever.  · Your symptoms get worse.  · Your symptoms do not get better within 10 days.  Get help right away if:  · You have a very bad headache.  · You cannot stop throwing up (vomiting).  · You have very bad pain or swelling around your face or eyes.  · You have trouble seeing.  · You feel confused.  · Your neck is stiff.  · You have trouble breathing.  Summary  · Sinusitis is swelling of your sinuses. Sinuses are hollow spaces in the bones around your face.  · This condition is caused by tissues in your nose that become inflamed or swollen. This traps germs. These can lead to infection.  · If you were prescribed an antibiotic medicine, take it as told by your doctor. Do not stop taking it even if you start to feel better.  · Keep all follow-up visits as told by your doctor. This is important.  This information is not intended to replace advice given to you by your health care provider. Make sure you discuss any questions you have with your health care provider.  Document Revised: 05/20/2019 Document Reviewed: 05/20/2019  [employer] Patient Education © 2021 [employer]    How to Perform a Sinus Rinse  A sinus rinse is a home treatment. It rinses your sinuses with a mixture of salt and water (saline solution). Sinuses are air-filled spaces in your skull behind the bones of your face and forehead. They open into your nasal cavity.  A sinus rinse can help to clear your nasal cavity. It can clear mucus, dirt, dust, or pollen.  You may do a sinus rinse when you have:  · A cold.  · A virus.  · Allergies.  · A sinus infection.  · A stuffy nose.  Talk with your doctor about whether a sinus rinse might help you.  What are the risks?  A sinus rinse is normally very safe and helpful. However, there are a few risks. These include:  · A burning feeling in the sinuses. This may happen if you do not make the saline solution as instructed. Be sure to follow all directions when making the saline solution.  · Nasal  irritation.  · Infection from unclean water. This is rare, but possible.  Do not do a sinus rinse if you have had:  · Ear or nasal surgery.  · An ear infection.  · Blocked ears.  Supplies needed:  · Saline solution or powder.  · Distilled or germ-free (sterile) water may be needed to mix with saline powder.  ? You may use boiled and cooled tap water. Boil tap water for 5 minutes; cool until it is lukewarm. Use within 24 hours.  ? Do not use regular tap water to mix with the saline solution.  · Neti pot or nasal rinse bottle. This releases the saline solution into your nose and through your sinuses. You can buy neti pots and rinse bottles:  ? At your local pharmacy.  ? At a health food store.  ? Online.  How to perform a sinus rinse    1. Wash your hands with soap and water.  2. Wash your device using the directions that came with it.  3. Dry your device.  4. Use the solution that comes with your device or one that is sold separately in stores. Follow the mixing directions on the package if you need to mix with sterile or distilled water.  5. Fill your device with the amount of saline solution stated in the device instructions.  6. Stand over a sink and tilt your head sideways over the sink.  7. Place the spout of the device in your upper nostril (the one closer to the ceiling).  8. Gently pour or squeeze the saline solution into your nasal cavity. The liquid should drain to your lower nostril if you are not too stuffed up (congested).  9. While rinsing, breathe through your open mouth.  10. Gently blow your nose to clear any mucus and rinse solution. Blowing too hard may cause ear pain.  11. Repeat in your other nostril.  12. Clean and rinse your device with clean water.  13. Air-dry your device.  Talk with your doctor or pharmacist if you have questions about how to do a sinus rinse.  Summary  · A sinus rinse is a home treatment. It rinses your sinuses with a mixture of salt and water (saline solution).  · A sinus  rinse is normally very safe and helpful. Follow all instructions carefully.  · Talk with your doctor about whether a sinus rinse might help you.  This information is not intended to replace advice given to you by your health care provider. Make sure you discuss any questions you have with your health care provider.  Document Revised: 10/15/2018 Document Reviewed: 10/15/2018  [employer] Patient Education © 2021 [employer]        Drink plenty of fluids; take claritin or zyrtec daily to reduce drainage    If symptoms worsen let me know

## 2022-03-28 ENCOUNTER — APPOINTMENT (OUTPATIENT)
Dept: OTHER | Facility: HOSPITAL | Age: 57
End: 2022-03-28

## 2022-03-28 ENCOUNTER — HOSPITAL ENCOUNTER (OUTPATIENT)
Dept: MAMMOGRAPHY | Facility: HOSPITAL | Age: 57
Discharge: HOME | End: 2022-03-28

## 2022-03-28 ENCOUNTER — HOSPITAL ENCOUNTER (OUTPATIENT)
Dept: BONE DENSITY | Facility: HOSPITAL | Age: 57
Discharge: HOME | End: 2022-03-28

## 2022-03-28 DIAGNOSIS — Z12.31 ENCOUNTER FOR SCREENING MAMMOGRAM FOR MALIGNANT NEOPLASM OF BREAST: ICD-10-CM

## 2022-03-28 DIAGNOSIS — Z78.0 MENOPAUSE: ICD-10-CM

## 2022-03-28 PROCEDURE — 77067 SCR MAMMO BI INCL CAD: CPT

## 2022-03-28 PROCEDURE — 77067 SCR MAMMO BI INCL CAD: CPT | Performed by: RADIOLOGY

## 2022-03-28 PROCEDURE — 77063 BREAST TOMOSYNTHESIS BI: CPT

## 2022-03-28 PROCEDURE — 77080 DXA BONE DENSITY AXIAL: CPT

## 2022-03-28 PROCEDURE — 77063 BREAST TOMOSYNTHESIS BI: CPT | Performed by: RADIOLOGY

## 2022-03-29 ENCOUNTER — OFFICE VISIT (OUTPATIENT)
Dept: FAMILY MEDICINE CLINIC | Facility: CLINIC | Age: 57
End: 2022-03-29

## 2022-03-29 VITALS
TEMPERATURE: 97.1 F | SYSTOLIC BLOOD PRESSURE: 128 MMHG | OXYGEN SATURATION: 98 % | BODY MASS INDEX: 25.28 KG/M2 | WEIGHT: 166.8 LBS | RESPIRATION RATE: 14 BRPM | HEART RATE: 83 BPM | DIASTOLIC BLOOD PRESSURE: 82 MMHG | HEIGHT: 68 IN

## 2022-03-29 DIAGNOSIS — Z00.00 ANNUAL PHYSICAL EXAM: Primary | ICD-10-CM

## 2022-03-29 DIAGNOSIS — F41.9 ANXIETY: ICD-10-CM

## 2022-03-29 DIAGNOSIS — J30.1 SEASONAL ALLERGIC RHINITIS DUE TO POLLEN: ICD-10-CM

## 2022-03-29 DIAGNOSIS — R39.15 URINARY URGENCY: ICD-10-CM

## 2022-03-29 LAB
BILIRUB UR QL STRIP: NEGATIVE
CLARITY UR: CLEAR
COLOR UR: YELLOW
GLUCOSE UR STRIP-MCNC: NEGATIVE MG/DL
HGB UR QL STRIP.AUTO: NEGATIVE
KETONES UR QL STRIP: NEGATIVE
LEUKOCYTE ESTERASE UR QL STRIP.AUTO: NEGATIVE
NITRITE UR QL STRIP: NEGATIVE
PH UR STRIP.AUTO: 6.5 [PH] (ref 5–8)
PROT UR QL STRIP: NEGATIVE
SP GR UR STRIP: 1.02 (ref 1–1.03)
UROBILINOGEN UR QL STRIP: NORMAL

## 2022-03-29 PROCEDURE — 81003 URINALYSIS AUTO W/O SCOPE: CPT | Performed by: NURSE PRACTITIONER

## 2022-03-29 PROCEDURE — 99396 PREV VISIT EST AGE 40-64: CPT | Performed by: NURSE PRACTITIONER

## 2022-03-29 NOTE — PROGRESS NOTES
Patient Care Team:  Rashmi Dasilva APRN as PCP - General (Nurse Practitioner)  Casie Fuentes DPM (Podiatry)     Chief Complaint   Patient presents with   • Annual Exam   • Anxiety       Chief complaint: Patient is in today for a physical     Patient is a 56 y.o. female who presents for her yearly physical exam.     HPI   Patient is here for annual physical, tries to eat a healthy diet, has increased walking, doing core exercises. 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 Saw podiatrist yesterday; had mammogram and Dexa.  Having a lot of drainage, congestion and sneezing  Review of Systems   Constitutional: Negative for activity change, appetite change, chills, diaphoresis, fatigue, fever and unexpected weight change.   HENT: Positive for congestion, postnasal drip, sneezing and sore throat (scratchy from drainage). Negative for ear pain (ear pressure right).    Eyes: Negative for discharge, itching and visual disturbance.   Respiratory: Positive for cough (occ from PND). Negative for shortness of breath and wheezing.    Cardiovascular: Negative for chest pain, palpitations and leg swelling.   Gastrointestinal: Negative for abdominal pain, blood in stool, constipation, diarrhea, nausea and vomiting.   Endocrine: Negative for cold intolerance, heat intolerance, polydipsia and polyuria.   Genitourinary: Positive for frequency and urgency. Negative for difficulty urinating, dysuria, menstrual problem and vaginal bleeding.   Musculoskeletal: Negative for arthralgias, back pain, neck pain and neck stiffness.   Skin: Negative for color change, pallor, rash and wound.   Allergic/Immunologic: Positive for environmental allergies.   Neurological: Positive for headaches (occ). Negative for dizziness, tremors, seizures, syncope, light-headedness and numbness.   Psychiatric/Behavioral: Negative for dysphoric mood, self-injury, sleep disturbance and suicidal ideas. The  patient is nervous/anxious (controlled).       History  Past Medical History:   Diagnosis Date   • Anxiety    • Bunion    • History of recurrent UTIs       Past Surgical History:   Procedure Laterality Date   •  SECTION     • WISDOM TOOTH EXTRACTION        No Known Allergies   Family History   Problem Relation Age of Onset   • Arthritis Mother    • Hypertension Mother    • Dementia Mother    • Dementia Father    • Prostate cancer Father    • Heart disease Maternal Grandfather    • Heart disease Paternal Grandfather    • Breast cancer Neg Hx    • Ovarian cancer Neg Hx      Social History     Socioeconomic History   • Marital status:    Tobacco Use   • Smoking status: Never Smoker   • Smokeless tobacco: Never Used   Vaping Use   • Vaping Use: Never used   Substance and Sexual Activity   • Alcohol use: No   • Drug use: No   • Sexual activity: Yes     Partners: Male     Birth control/protection: Post-menopausal        Current Outpatient Medications:   •  Progesterone Micronized (PROGESTERONE COMPOUNDING KIT TD), Place  on the skin as directed by provider. Progesterone DHEA Pregnenolone Bi-Est  50/15/5/0.5 ml cream, Disp: , Rfl:   •  sertraline (ZOLOFT) 50 MG tablet, Take 1 tablet by mouth Daily., Disp: 90 tablet, Rfl: 3  •  fluticasone (Flonase) 50 MCG/ACT nasal spray, 2 sprays into the nostril(s) as directed by provider Daily., Disp: 16 g, Rfl: 11  •  Loratadine (CLARITIN PO), Take  by mouth Daily., Disp: , Rfl:     Immunizations   N/A   Prescribed/Refused   Date     Td/Tdap  (Booster Q 10 yrs)   []           Prescribed    []     Refused        []           Flu  (Yearly)   []        Prescribed    []     Refused        []           Pneumovax  (1 yr after Prevnar)   []        Prescribed    []     Refused        []           Prevnar 13  (1 yr after Pneumo)   []        Prescribed    []     Refused        []           Hep B     []        Prescribed    []     Refused        []           Shingles  (Age 50  and older)   []        Prescribed    []     Refused        []           Immunization History   Administered Date(s) Administered   • COVID-19 (PFIZER) PURPLE CAP 01/21/2021, 02/12/2021   • Flu Vaccine Intradermal Quad 18-64YR 10/13/2021   • Flu Vaccine Quad PF >36MO 11/16/2020     Health Maintenance   Topic Date Due   • MAMMOGRAM  Never done   • TDAP/TD VACCINES (1 - Tdap) Never done   • ZOSTER VACCINE (1 of 2) Never done   • ANNUAL PHYSICAL  08/11/2021   • COVID-19 Vaccine (3 - Booster for Pfizer series) 03/31/2022 (Originally 7/12/2021)   • PAP SMEAR  08/10/2023   • DXA SCAN  03/28/2024   • COLORECTAL CANCER SCREENING  12/07/2024   • HEPATITIS C SCREENING  Completed   • INFLUENZA VACCINE  Completed   • Pneumococcal Vaccine 0-64  Aged Out        Diabetes  [] Yes  [] No   N/A      Date     Eye Exam     []             []   Complete     []   Recommended Date:  Where:       Foot Exam     []         []   Complete          Obesity Counseling     []       []   Complete     No results found for: HGBA1C, MICROALBUR    Additional Testing      Date     Colorectal Screening       []   N/A   []   Complete    []   Ordered     Date:    Where:       Pap      []   N/A   []   Complete   []   OB/GYN Date:    Where:       Mammogram        []   N/A   []   Complete   []  OB/GYN   []   Ordered Date:    Where:     PSA  (Over age 50)    []   N/A   []   Complete   []   Ordered Date:    Where:     US Aorta  (For male smokers, age 65)     []   N/A   []   Complete   []   Ordered Date:    Where:     CT for Smoker  (Age 55-75, 30 pk yr)    []   N/A   []   Complete   []   Ordered Date:    Where:     Bone Density/DEXA      []   N/A   []   Complete   []   Ordered Date:    Follow-up:     Hep. C        []   N/A   []   Complete   []   Ordered Date:    Where:     Results for orders placed or performed in visit on 03/29/22   Urinalysis With Culture If Indicated - Urine, Clean Catch    Specimen: Urine, Clean Catch   Result Value Ref Range    Color, UA  "Yellow Yellow, Straw    Appearance, UA Clear Clear    pH, UA 6.5 5.0 - 8.0    Specific Gravity, UA 1.020 1.005 - 1.030    Glucose, UA Negative Negative    Ketones, UA Negative Negative    Bilirubin, UA Negative Negative    Blood, UA Negative Negative    Protein, UA Negative Negative    Leuk Esterase, UA Negative Negative    Nitrite, UA Negative Negative    Urobilinogen, UA 0.2 E.U./dL 0.2 - 1.0 E.U./dL            /82   Pulse 83   Temp 97.1 °F (36.2 °C) (Infrared)   Resp 14   Ht 173.2 cm (68.19\")   Wt 75.7 kg (166 lb 12.8 oz)   SpO2 98%   BMI 25.22 kg/m²       Physical Exam  Vitals and nursing note reviewed.   Constitutional:       General: She is not in acute distress.     Appearance: Normal appearance. She is well-developed. She is not diaphoretic.   HENT:      Head: Normocephalic and atraumatic.      Right Ear: Tympanic membrane and external ear normal.      Left Ear: Tympanic membrane and external ear normal.      Nose: Nose normal.   Eyes:      General: No scleral icterus.        Right eye: No discharge.         Left eye: No discharge.      Conjunctiva/sclera: Conjunctivae normal.      Pupils: Pupils are equal, round, and reactive to light.   Neck:      Thyroid: No thyromegaly.      Vascular: No carotid bruit or JVD.      Trachea: No tracheal deviation.   Cardiovascular:      Rate and Rhythm: Normal rate and regular rhythm.      Pulses: Normal pulses.      Heart sounds: Normal heart sounds. No murmur heard.    No friction rub. No gallop.   Pulmonary:      Effort: Pulmonary effort is normal. No respiratory distress.      Breath sounds: Normal breath sounds. No stridor. No wheezing or rales.   Chest:      Chest wall: No tenderness.   Breasts:      Right: Normal. No axillary adenopathy or supraclavicular adenopathy.      Left: Normal. No axillary adenopathy or supraclavicular adenopathy.       Abdominal:      General: Bowel sounds are normal. There is no distension.      Palpations: Abdomen is soft. " There is no mass.      Tenderness: There is no abdominal tenderness. There is no guarding or rebound.      Hernia: No hernia is present.   Musculoskeletal:         General: No tenderness or deformity. Normal range of motion.      Cervical back: Normal range of motion and neck supple.      Right lower leg: No edema.      Left lower leg: No edema.   Lymphadenopathy:      Cervical: No cervical adenopathy.      Upper Body:      Right upper body: No supraclavicular, axillary or pectoral adenopathy.      Left upper body: No supraclavicular, axillary or pectoral adenopathy.   Skin:     General: Skin is warm and dry.      Coloration: Skin is not pale.      Findings: No erythema or rash.   Neurological:      Mental Status: She is alert and oriented to person, place, and time.      Cranial Nerves: No cranial nerve deficit.      Motor: No abnormal muscle tone.      Coordination: Coordination normal.      Deep Tendon Reflexes: Reflexes are normal and symmetric. Reflexes normal.   Psychiatric:         Behavior: Behavior normal.         Thought Content: Thought content normal.         Judgment: Judgment normal.             Counseling provided on diet and nutrition, exercise and weight management.    Diagnoses and all orders for this visit:    Annual physical exam    Urinary urgency  -     Urinalysis With Culture If Indicated - Urine, Clean Catch; Future  -     Urinalysis With Culture If Indicated - Urine, Clean Catch    Anxiety  -     sertraline (ZOLOFT) 50 MG tablet; Take 1 tablet by mouth Daily.    Seasonal allergic rhinitis due to pollen    Other orders  -     Loratadine (CLARITIN PO); Take  by mouth Daily.       Labs ordered previously, will have drawn today. I will contact patient regarding test results and provide instructions regarding any necessary changes in plan of care.  Anxiety is controlled, continue sertraline  Claritin and flonase for allergy symptoms  Nutrition and activity goals reviewed including: mainly water  to drink, limit white flour/processed sugar, and processed foods, choose fresh fruits, vegetables, fish, lean meats,high fiber carbs, exercise  working toward 150 mins cardio per week, weight training 2x/week.Adequate calcium, vitamin D and weight bearing exercise such as walking are important to reduce the risk of osteoporosis.    Rashmi Dasilva, MARIANO   3/31/2022   17:10 EDT          Please note that portions of this document were completed with a voice recognition program.

## 2022-05-23 ENCOUNTER — HOSPITAL ENCOUNTER (OUTPATIENT)
Dept: ULTRASOUND IMAGING | Facility: HOSPITAL | Age: 57
Discharge: HOME | End: 2022-05-23

## 2022-05-23 ENCOUNTER — HOSPITAL ENCOUNTER (OUTPATIENT)
Dept: MAMMOGRAPHY | Facility: HOSPITAL | Age: 57
Discharge: HOME | End: 2022-05-23

## 2022-05-23 DIAGNOSIS — R92.8 ABNORMAL MAMMOGRAM: ICD-10-CM

## 2022-05-23 PROCEDURE — 76642 ULTRASOUND BREAST LIMITED: CPT | Performed by: RADIOLOGY

## 2022-05-23 PROCEDURE — 77065 DX MAMMO INCL CAD UNI: CPT | Performed by: RADIOLOGY

## 2022-05-23 PROCEDURE — 77065 DX MAMMO INCL CAD UNI: CPT

## 2022-05-23 PROCEDURE — 77061 BREAST TOMOSYNTHESIS UNI: CPT | Performed by: RADIOLOGY

## 2022-05-23 PROCEDURE — 76642 ULTRASOUND BREAST LIMITED: CPT

## 2022-05-23 PROCEDURE — G0279 TOMOSYNTHESIS, MAMMO: HCPCS

## 2023-04-26 DIAGNOSIS — F41.9 ANXIETY: ICD-10-CM

## 2023-04-27 NOTE — TELEPHONE ENCOUNTER
Rashmi Dasilva, MARIANO 22 hours ago (11:25 AM)       Please schedule an annual physical        Called and left vm for patient to return call    OK FOR HUB TO RELAY MESSAGE AND SCHEDULE ANNUAL APPOINTMENT

## 2023-10-05 ENCOUNTER — LAB (OUTPATIENT)
Dept: LAB | Facility: HOSPITAL | Age: 58
End: 2023-10-05
Payer: COMMERCIAL

## 2023-10-05 ENCOUNTER — OFFICE VISIT (OUTPATIENT)
Dept: FAMILY MEDICINE CLINIC | Facility: CLINIC | Age: 58
End: 2023-10-05
Payer: COMMERCIAL

## 2023-10-05 VITALS
DIASTOLIC BLOOD PRESSURE: 72 MMHG | OXYGEN SATURATION: 99 % | RESPIRATION RATE: 14 BRPM | WEIGHT: 152.8 LBS | HEIGHT: 68 IN | HEART RATE: 84 BPM | SYSTOLIC BLOOD PRESSURE: 116 MMHG | BODY MASS INDEX: 23.16 KG/M2

## 2023-10-05 DIAGNOSIS — Z12.31 ENCOUNTER FOR SCREENING MAMMOGRAM FOR MALIGNANT NEOPLASM OF BREAST: ICD-10-CM

## 2023-10-05 DIAGNOSIS — Z01.419 ENCOUNTER FOR CERVICAL PAP SMEAR WITH PELVIC EXAM: ICD-10-CM

## 2023-10-05 DIAGNOSIS — E78.5 DYSLIPIDEMIA, GOAL LDL BELOW 100: ICD-10-CM

## 2023-10-05 DIAGNOSIS — R39.15 URGENCY OF URINATION: ICD-10-CM

## 2023-10-05 DIAGNOSIS — F41.9 ANXIETY: ICD-10-CM

## 2023-10-05 DIAGNOSIS — Z00.00 ANNUAL PHYSICAL EXAM: Primary | ICD-10-CM

## 2023-10-05 DIAGNOSIS — Z23 NEEDS FLU SHOT: ICD-10-CM

## 2023-10-05 DIAGNOSIS — E55.9 VITAMIN D DEFICIENCY: ICD-10-CM

## 2023-10-05 LAB
25(OH)D3 SERPL-MCNC: 41 NG/ML (ref 30–100)
ALBUMIN SERPL-MCNC: 4.8 G/DL (ref 3.5–5.2)
ALBUMIN/GLOB SERPL: 1.9 G/DL
ALP SERPL-CCNC: 48 U/L (ref 39–117)
ALT SERPL W P-5'-P-CCNC: 14 U/L (ref 1–33)
ANION GAP SERPL CALCULATED.3IONS-SCNC: 11 MMOL/L (ref 5–15)
AST SERPL-CCNC: 25 U/L (ref 1–32)
BASOPHILS # BLD AUTO: 0.1 10*3/MM3 (ref 0–0.2)
BASOPHILS NFR BLD AUTO: 2 % (ref 0–1.5)
BILIRUB SERPL-MCNC: 0.4 MG/DL (ref 0–1.2)
BILIRUB UR QL STRIP: NEGATIVE
BUN SERPL-MCNC: 16 MG/DL (ref 6–20)
BUN/CREAT SERPL: 20.3 (ref 7–25)
CALCIUM SPEC-SCNC: 9.6 MG/DL (ref 8.6–10.5)
CHLORIDE SERPL-SCNC: 105 MMOL/L (ref 98–107)
CHOLEST SERPL-MCNC: 234 MG/DL (ref 0–200)
CLARITY UR: CLEAR
CO2 SERPL-SCNC: 24 MMOL/L (ref 22–29)
COLOR UR: YELLOW
CREAT SERPL-MCNC: 0.79 MG/DL (ref 0.57–1)
DEPRECATED RDW RBC AUTO: 41.2 FL (ref 37–54)
EGFRCR SERPLBLD CKD-EPI 2021: 87.9 ML/MIN/1.73
EOSINOPHIL # BLD AUTO: 0.17 10*3/MM3 (ref 0–0.4)
EOSINOPHIL NFR BLD AUTO: 3.4 % (ref 0.3–6.2)
ERYTHROCYTE [DISTWIDTH] IN BLOOD BY AUTOMATED COUNT: 12.4 % (ref 12.3–15.4)
GLOBULIN UR ELPH-MCNC: 2.5 GM/DL
GLUCOSE SERPL-MCNC: 80 MG/DL (ref 65–99)
GLUCOSE UR STRIP-MCNC: NEGATIVE MG/DL
HCT VFR BLD AUTO: 41.3 % (ref 34–46.6)
HDLC SERPL-MCNC: 77 MG/DL (ref 40–60)
HGB BLD-MCNC: 14.1 G/DL (ref 12–15.9)
HGB UR QL STRIP.AUTO: NEGATIVE
HOLD SPECIMEN: NORMAL
IMM GRANULOCYTES # BLD AUTO: 0.01 10*3/MM3 (ref 0–0.05)
IMM GRANULOCYTES NFR BLD AUTO: 0.2 % (ref 0–0.5)
KETONES UR QL STRIP: ABNORMAL
LDLC SERPL CALC-MCNC: 147 MG/DL (ref 0–100)
LDLC/HDLC SERPL: 1.89 {RATIO}
LEUKOCYTE ESTERASE UR QL STRIP.AUTO: NEGATIVE
LYMPHOCYTES # BLD AUTO: 1.52 10*3/MM3 (ref 0.7–3.1)
LYMPHOCYTES NFR BLD AUTO: 30.5 % (ref 19.6–45.3)
MCH RBC QN AUTO: 30.7 PG (ref 26.6–33)
MCHC RBC AUTO-ENTMCNC: 34.1 G/DL (ref 31.5–35.7)
MCV RBC AUTO: 90 FL (ref 79–97)
MONOCYTES # BLD AUTO: 0.4 10*3/MM3 (ref 0.1–0.9)
MONOCYTES NFR BLD AUTO: 8 % (ref 5–12)
NEUTROPHILS NFR BLD AUTO: 2.79 10*3/MM3 (ref 1.7–7)
NEUTROPHILS NFR BLD AUTO: 55.9 % (ref 42.7–76)
NITRITE UR QL STRIP: NEGATIVE
NRBC BLD AUTO-RTO: 0 /100 WBC (ref 0–0.2)
PH UR STRIP.AUTO: 6 [PH] (ref 5–8)
PLATELET # BLD AUTO: 235 10*3/MM3 (ref 140–450)
PMV BLD AUTO: 10.7 FL (ref 6–12)
POTASSIUM SERPL-SCNC: 4.3 MMOL/L (ref 3.5–5.2)
PROT SERPL-MCNC: 7.3 G/DL (ref 6–8.5)
PROT UR QL STRIP: NEGATIVE
RBC # BLD AUTO: 4.59 10*6/MM3 (ref 3.77–5.28)
SODIUM SERPL-SCNC: 140 MMOL/L (ref 136–145)
SP GR UR STRIP: 1.02 (ref 1–1.03)
TRIGL SERPL-MCNC: 58 MG/DL (ref 0–150)
TSH SERPL DL<=0.05 MIU/L-ACNC: 2.86 UIU/ML (ref 0.27–4.2)
UROBILINOGEN UR QL STRIP: ABNORMAL
VLDLC SERPL-MCNC: 10 MG/DL (ref 5–40)
WBC NRBC COR # BLD: 4.99 10*3/MM3 (ref 3.4–10.8)

## 2023-10-05 PROCEDURE — 80061 LIPID PANEL: CPT | Performed by: NURSE PRACTITIONER

## 2023-10-05 PROCEDURE — 81003 URINALYSIS AUTO W/O SCOPE: CPT | Performed by: NURSE PRACTITIONER

## 2023-10-05 PROCEDURE — 80050 GENERAL HEALTH PANEL: CPT | Performed by: NURSE PRACTITIONER

## 2023-10-05 PROCEDURE — 82306 VITAMIN D 25 HYDROXY: CPT | Performed by: NURSE PRACTITIONER

## 2023-10-05 RX ORDER — BENZONATATE 200 MG/1
1 CAPSULE ORAL 3 TIMES DAILY
COMMUNITY
Start: 2023-09-14

## 2023-10-05 RX ORDER — AMOXICILLIN AND CLAVULANATE POTASSIUM 500; 125 MG/1; MG/1
1 TABLET, FILM COATED ORAL 3 TIMES DAILY
COMMUNITY
Start: 2023-09-21

## 2023-10-05 NOTE — PROGRESS NOTES
Patient Care Team:  Rashmi Dasilva APRN as PCP - General (Nurse Practitioner)  Casie Fuentes DPM (Podiatry)     Chief complaint: Patient is in today for a physical     Patient is a 58 y.o. female who presents for her yearly physical exam.   Chief Complaint   Patient presents with    Annual Exam     Without Pap. Patient stated that she has not had a pap in a few years and might need referral for a OBGYN    Flu Vaccine       HPI   Eats a healthy diet, does walking video in the morning. Is currently on antibiotic for URI    Review of Systems   Constitutional:  Negative for appetite change, chills, fatigue, fever and unexpected weight change.   HENT:  Positive for postnasal drip and sinus pressure. Negative for congestion, ear pain and sore throat.    Eyes:  Negative for photophobia, pain, redness and visual disturbance.   Respiratory:  Positive for cough (r/t PND). Negative for shortness of breath and wheezing.    Cardiovascular:  Negative for chest pain, palpitations and leg swelling.   Gastrointestinal:  Negative for abdominal pain, blood in stool, constipation and diarrhea.   Endocrine: Negative for cold intolerance, heat intolerance, polydipsia and polyuria.   Genitourinary:  Positive for frequency and urgency. Negative for difficulty urinating, dysuria and vaginal bleeding.   Musculoskeletal:  Negative for arthralgias and back pain.   Skin:  Negative for color change, pallor, rash and wound.   Neurological:  Positive for headaches (occ). Negative for dizziness, light-headedness and numbness.   Psychiatric/Behavioral:  Negative for dysphoric mood, self-injury, sleep disturbance and suicidal ideas. The patient is nervous/anxious (controlled with med).       History  Past Medical History:   Diagnosis Date    Anxiety     Bunion     History of recurrent UTIs       Past Surgical History:   Procedure Laterality Date     SECTION      WISDOM TOOTH EXTRACTION        No Known Allergies   Family  History   Problem Relation Age of Onset    Arthritis Mother     Hypertension Mother     Dementia Mother     Dementia Father     Prostate cancer Father     Heart disease Maternal Grandfather     Heart disease Paternal Grandfather     Breast cancer Neg Hx     Ovarian cancer Neg Hx      Social History     Socioeconomic History    Marital status:    Tobacco Use    Smoking status: Never    Smokeless tobacco: Never   Vaping Use    Vaping Use: Never used   Substance and Sexual Activity    Alcohol use: No    Drug use: No    Sexual activity: Yes     Partners: Male     Birth control/protection: Post-menopausal        Current Outpatient Medications:     amoxicillin-clavulanate (AUGMENTIN) 500-125 MG per tablet, Take 1 tablet by mouth 3 times a day., Disp: , Rfl:     benzonatate (TESSALON) 200 MG capsule, Take 1 capsule by mouth 3 times a day., Disp: , Rfl:     sertraline (ZOLOFT) 50 MG tablet, Take 1 tablet by mouth Daily., Disp: 90 tablet, Rfl: 3    fluticasone (Flonase) 50 MCG/ACT nasal spray, 2 sprays into the nostril(s) as directed by provider Daily., Disp: 16 g, Rfl: 11    Loratadine (CLARITIN PO), Take  by mouth Daily., Disp: , Rfl:     Progesterone Micronized (PROGESTERONE COMPOUNDING KIT TD), Place  on the skin as directed by provider. Progesterone DHEA Pregnenolone Bi-Est  50/15/5/0.5 ml cream, Disp: , Rfl:     rosuvastatin (Crestor) 5 MG tablet, Take 1 tablet by mouth Every Night., Disp: 90 tablet, Rfl: 1    Immunizations   N/A   Prescribed/Refused   Date     Td/Tdap  (Booster Q 10 yrs)   []           Prescribed    []     Refused        []           Flu  (Yearly)   []        Prescribed    [x]     Refused        []           Pneumonia      []        Prescribed    []     Refused        []                 Hep B     []        Prescribed    []     Refused        []           Shingles  (Age 50 and older)   []        Prescribed    []     Refused        []           Immunization History   Administered Date(s)  "Administered    COVID-19 (PFIZER) Purple Cap Monovalent 01/21/2021, 02/12/2021    Flu Vaccine Intradermal Quad 18-64YR 10/13/2021    Flu Vaccine Quad PF >36MO 11/16/2020    Fluzone (or Fluarix & Flulaval for VFC) >6mos 11/16/2020, 10/05/2023    Influenza Seasonal Injectable 10/13/2021     Health Maintenance   Topic Date Due    TDAP/TD VACCINES (1 - Tdap) Never done    ZOSTER VACCINE (1 of 2) Never done    ANNUAL PHYSICAL  03/29/2023    MAMMOGRAM  05/23/2023    PAP SMEAR  08/10/2023    COVID-19 Vaccine (3 - 2023-24 season) 09/01/2023    DXA SCAN  03/28/2024    LIPID PANEL  10/05/2024    COLORECTAL CANCER SCREENING  12/07/2024    HEPATITIS C SCREENING  Completed    INFLUENZA VACCINE  Completed    Pneumococcal Vaccine 0-64  Aged Out        Diabetes  [] Yes  [x] No   N/A      Date     Eye Exam     []             []   Complete     []   Recommended Date:  Where:       Foot Exam     []         []   Complete          Obesity Counseling     []       []   Complete     No results found for: \"HGBA1C\", \"MICROALBUR\"    Additional Testing      Date     Colorectal Screening       []   N/A   []   Complete    []   Ordered     Date:    Where:       Pap      []   N/A   []   Complete   [x]   OB/GYN Date:    Where:       Mammogram        []   N/A   []   Complete   []  OB/GYN   [x]   Ordered Date:    Where:     PSA  (Over age 50)    []   N/A   []   Complete   []   Ordered Date:    Where:     US Aorta  (For male smokers, age 65)     []   N/A   []   Complete   []   Ordered Date:    Where:     CT for Smoker  (Age 55-75, 30 pk yr)    []   N/A   []   Complete   []   Ordered Date:    Where:     Bone Density/DEXA      []   N/A   []   Complete   []   Ordered Date:    Follow-up:     Hep. C        []   N/A   []   Complete   []   Ordered Date:    Where:     Results for orders placed or performed in visit on 10/05/23   CBC Auto Differential    Specimen: Blood   Result Value Ref Range    WBC 4.99 3.40 - 10.80 10*3/mm3    RBC 4.59 3.77 - 5.28 10*6/mm3 "    Hemoglobin 14.1 12.0 - 15.9 g/dL    Hematocrit 41.3 34.0 - 46.6 %    MCV 90.0 79.0 - 97.0 fL    MCH 30.7 26.6 - 33.0 pg    MCHC 34.1 31.5 - 35.7 g/dL    RDW 12.4 12.3 - 15.4 %    RDW-SD 41.2 37.0 - 54.0 fl    MPV 10.7 6.0 - 12.0 fL    Platelets 235 140 - 450 10*3/mm3    Neutrophil % 55.9 42.7 - 76.0 %    Lymphocyte % 30.5 19.6 - 45.3 %    Monocyte % 8.0 5.0 - 12.0 %    Eosinophil % 3.4 0.3 - 6.2 %    Basophil % 2.0 (H) 0.0 - 1.5 %    Immature Grans % 0.2 0.0 - 0.5 %    Neutrophils, Absolute 2.79 1.70 - 7.00 10*3/mm3    Lymphocytes, Absolute 1.52 0.70 - 3.10 10*3/mm3    Monocytes, Absolute 0.40 0.10 - 0.90 10*3/mm3    Eosinophils, Absolute 0.17 0.00 - 0.40 10*3/mm3    Basophils, Absolute 0.10 0.00 - 0.20 10*3/mm3    Immature Grans, Absolute 0.01 0.00 - 0.05 10*3/mm3    nRBC 0.0 0.0 - 0.2 /100 WBC   Comprehensive Metabolic Panel    Specimen: Blood   Result Value Ref Range    Glucose 80 65 - 99 mg/dL    BUN 16 6 - 20 mg/dL    Creatinine 0.79 0.57 - 1.00 mg/dL    Sodium 140 136 - 145 mmol/L    Potassium 4.3 3.5 - 5.2 mmol/L    Chloride 105 98 - 107 mmol/L    CO2 24.0 22.0 - 29.0 mmol/L    Calcium 9.6 8.6 - 10.5 mg/dL    Total Protein 7.3 6.0 - 8.5 g/dL    Albumin 4.8 3.5 - 5.2 g/dL    ALT (SGPT) 14 1 - 33 U/L    AST (SGOT) 25 1 - 32 U/L    Alkaline Phosphatase 48 39 - 117 U/L    Total Bilirubin 0.4 0.0 - 1.2 mg/dL    Globulin 2.5 gm/dL    A/G Ratio 1.9 g/dL    BUN/Creatinine Ratio 20.3 7.0 - 25.0    Anion Gap 11.0 5.0 - 15.0 mmol/L    eGFR 87.9 >60.0 mL/min/1.73   Lipid Panel    Specimen: Blood   Result Value Ref Range    Total Cholesterol 234 (H) 0 - 200 mg/dL    Triglycerides 58 0 - 150 mg/dL    HDL Cholesterol 77 (H) 40 - 60 mg/dL    LDL Cholesterol  147 (H) 0 - 100 mg/dL    VLDL Cholesterol 10 5 - 40 mg/dL    LDL/HDL Ratio 1.89    Vitamin D,25-Hydroxy    Specimen: Blood   Result Value Ref Range    25 Hydroxy, Vitamin D 41.0 30.0 - 100.0 ng/ml   TSH Rfx On Abnormal To Free T4    Specimen: Blood   Result Value Ref  "Range    TSH 2.860 0.270 - 4.200 uIU/mL   Urinalysis With Culture If Indicated - Urine, Clean Catch    Specimen: Urine, Clean Catch   Result Value Ref Range    Color, UA Yellow Yellow, Straw    Appearance, UA Clear Clear    pH, UA 6.0 5.0 - 8.0    Specific Gravity, UA 1.023 1.005 - 1.030    Glucose, UA Negative Negative    Ketones, UA Trace (A) Negative    Bilirubin, UA Negative Negative    Blood, UA Negative Negative    Protein, UA Negative Negative    Leuk Esterase, UA Negative Negative    Nitrite, UA Negative Negative    Urobilinogen, UA 0.2 E.U./dL 0.2 - 1.0 E.U./dL   [place] Urine Culture Tube - Urine, Clean Catch    Specimen: Urine, Clean Catch   Result Value Ref Range    Extra Tube Hold for add-ons.             /72 (BP Location: Left arm, Patient Position: Sitting, Cuff Size: Adult)   Pulse 84   Resp 14   Ht 173.2 cm (68.19\")   Wt 69.3 kg (152 lb 12.8 oz)   SpO2 99%   BMI 23.10 kg/mý       Physical Exam  Vitals and nursing note reviewed.   Constitutional:       General: She is not in acute distress.     Appearance: Normal appearance. She is well-developed. She is not diaphoretic.   HENT:      Head: Normocephalic and atraumatic.      Right Ear: External ear normal.      Left Ear: External ear normal.      Nose: Nose normal.   Eyes:      General: No scleral icterus.        Right eye: No discharge.         Left eye: No discharge.      Conjunctiva/sclera: Conjunctivae normal.      Pupils: Pupils are equal, round, and reactive to light.   Neck:      Thyroid: No thyromegaly.      Vascular: No carotid bruit or JVD.      Trachea: No tracheal deviation.   Cardiovascular:      Rate and Rhythm: Normal rate and regular rhythm.      Heart sounds: Normal heart sounds. No murmur heard.     No friction rub. No gallop.   Pulmonary:      Effort: Pulmonary effort is normal. No respiratory distress.      Breath sounds: Normal breath sounds. No stridor. No wheezing or rales.   Chest:      Chest wall: No tenderness. "   Abdominal:      General: Bowel sounds are normal. There is no distension.      Palpations: Abdomen is soft. There is no mass.      Tenderness: There is no abdominal tenderness. There is no guarding or rebound.      Hernia: No hernia is present.   Musculoskeletal:         General: No tenderness or deformity. Normal range of motion.      Cervical back: Normal range of motion and neck supple.   Lymphadenopathy:      Cervical: No cervical adenopathy.   Skin:     General: Skin is warm and dry.      Coloration: Skin is not pale.      Findings: No erythema or rash.   Neurological:      Mental Status: She is alert and oriented to person, place, and time.      Cranial Nerves: No cranial nerve deficit.      Motor: No abnormal muscle tone.      Coordination: Coordination normal.      Deep Tendon Reflexes: Reflexes are normal and symmetric. Reflexes normal.   Psychiatric:         Behavior: Behavior normal.         Thought Content: Thought content normal.         Judgment: Judgment normal.             Counseling provided on diet and nutrition, exercise, weight management, and GERD.    Diagnoses and all orders for this visit:    Annual physical exam  -     CBC Auto Differential; Future  -     Comprehensive Metabolic Panel; Future  -     Lipid Panel; Future  -     TSH Rfx On Abnormal To Free T4; Future  -     CBC Auto Differential  -     Comprehensive Metabolic Panel  -     Lipid Panel  -     TSH Rfx On Abnormal To Free T4    Needs flu shot  -     Fluzone >6 Months (2099-4510)    Encounter for screening mammogram for malignant neoplasm of breast  -     Mammo Screening Digital Tomosynthesis Bilateral With CAD; Future    Vitamin D deficiency  -     Vitamin D,25-Hydroxy; Future  -     Vitamin D,25-Hydroxy    Encounter for cervical Pap smear with pelvic exam  -     Ambulatory Referral to Gynecology    Urgency of urination  -     Urinalysis With Culture If Indicated -; Future  -     Urinalysis With Culture If Indicated - Urine, Clean  Catch  -     [place] Urine Culture Tube - Urine, Clean Catch    Anxiety  -     sertraline (ZOLOFT) 50 MG tablet; Take 1 tablet by mouth Daily.    Dyslipidemia, goal LDL below 100  -     rosuvastatin (Crestor) 5 MG tablet; Take 1 tablet by mouth Every Night.  -     Lipid Panel; Future  -     Comprehensive Metabolic Panel; Future    Other orders  -     benzonatate (TESSALON) 200 MG capsule; Take 1 capsule by mouth 3 times a day.  -     amoxicillin-clavulanate (AUGMENTIN) 500-125 MG per tablet; Take 1 tablet by mouth 3 times a day.     Screening labs ordered to evaluate chronic conditions. I will contact patient regarding test results and provide instructions regarding any necessary changes in plan of care.  Referred to GYN for pap  Anxiety is controlled, continue sertraline  Rosuvastatin prescribed for dyslipidemia, repeat lipid and cmp in 2-3 months  Nutrition and activity goals reviewed including: mainly water to drink, limit white flour/processed sugar, and processed foods, choose fresh fruits, vegetables, fish, lean meats,high fiber carbs, exercise  working toward 150 mins cardio per week, weight training 2x/week.    MARIANO Villegas   10/9/2023   21:40 EDT          Please note that portions of this document were completed with a voice recognition program.     At [facility], we believe that sharing information builds trust and better relationships. You are receiving this note because you are receiving care at [facility] or have recently visited. It is possible you will see health information before a provider has talked with you about it. This kind of information can be easy to misunderstand. To help you fully understand what it means for your health, we urge you to discuss this note with your provider.

## 2023-10-09 RX ORDER — ROSUVASTATIN CALCIUM 5 MG/1
5 TABLET, COATED ORAL NIGHTLY
Qty: 90 TABLET | Refills: 1 | Status: SHIPPED | OUTPATIENT
Start: 2023-10-09

## 2023-10-10 ENCOUNTER — TELEPHONE (OUTPATIENT)
Dept: FAMILY MEDICINE CLINIC | Facility: CLINIC | Age: 58
End: 2023-10-10
Payer: COMMERCIAL

## 2023-10-10 NOTE — TELEPHONE ENCOUNTER
----- Message from MARIANO Villegas sent at 10/9/2023  9:06 PM EDT -----  Let patient know I have sent in a prescription for rosuvastatin to lower her bad LDL cholesterol. Letter in MyChart. Repeat labs in 2-3 months      LVM for patient to return my call.    Hub may relay message and document.

## 2023-10-11 NOTE — TELEPHONE ENCOUNTER
Informed patient of labs and crestor medication sent to pharmacy. Also advised to repeat labs in 2-3 months after starting medication. She verbalized understanding and had no further questions or concerns at this time.

## 2023-12-30 DIAGNOSIS — F41.9 ANXIETY: ICD-10-CM

## 2024-06-19 ENCOUNTER — OFFICE VISIT (OUTPATIENT)
Dept: INTERNAL MEDICINE | Facility: CLINIC | Age: 59
End: 2024-06-19
Payer: COMMERCIAL

## 2024-06-19 VITALS
DIASTOLIC BLOOD PRESSURE: 68 MMHG | BODY MASS INDEX: 23.98 KG/M2 | WEIGHT: 158.2 LBS | RESPIRATION RATE: 14 BRPM | OXYGEN SATURATION: 99 % | SYSTOLIC BLOOD PRESSURE: 110 MMHG | HEIGHT: 68 IN | HEART RATE: 69 BPM | TEMPERATURE: 97.6 F

## 2024-06-19 DIAGNOSIS — M21.611 BILATERAL BUNIONS: Chronic | ICD-10-CM

## 2024-06-19 DIAGNOSIS — F41.9 ANXIETY: ICD-10-CM

## 2024-06-19 DIAGNOSIS — Z01.419 VISIT FOR GYNECOLOGIC EXAMINATION: ICD-10-CM

## 2024-06-19 DIAGNOSIS — J30.89 PERENNIAL ALLERGIC RHINITIS: Chronic | ICD-10-CM

## 2024-06-19 DIAGNOSIS — M79.671 CHRONIC FOOT PAIN, RIGHT: Primary | Chronic | ICD-10-CM

## 2024-06-19 DIAGNOSIS — Z13.820 OSTEOPOROSIS SCREENING: ICD-10-CM

## 2024-06-19 DIAGNOSIS — F41.1 GENERALIZED ANXIETY DISORDER: Chronic | ICD-10-CM

## 2024-06-19 DIAGNOSIS — M85.89 OSTEOPENIA OF MULTIPLE SITES: ICD-10-CM

## 2024-06-19 DIAGNOSIS — G89.29 CHRONIC FOOT PAIN, RIGHT: Primary | Chronic | ICD-10-CM

## 2024-06-19 DIAGNOSIS — E78.00 HYPERCHOLESTEROLEMIA: Chronic | ICD-10-CM

## 2024-06-19 DIAGNOSIS — M21.612 BILATERAL BUNIONS: Chronic | ICD-10-CM

## 2024-06-19 PROCEDURE — 99213 OFFICE O/P EST LOW 20 MIN: CPT | Performed by: INTERNAL MEDICINE

## 2024-06-19 RX ORDER — VITAMIN B COMPLEX
CAPSULE ORAL DAILY
COMMUNITY

## 2024-06-19 RX ORDER — MELATONIN
1000 DAILY
COMMUNITY

## 2024-06-19 RX ORDER — CHLORAL HYDRATE 500 MG
CAPSULE ORAL
COMMUNITY

## 2024-06-19 NOTE — PATIENT INSTRUCTIONS
Patient Instructions  Problem List Items Addressed This Visit          Allergies and Adverse Reactions    Perennial allergic rhinitis (Chronic)       Cardiac and Vasculature    Hypercholesterolemia (Chronic)    Overview     LDL in 2022 was 168, in 10/2023 it was 147.  Normal HDL.            Genitourinary and Reproductive     Visit for gynecologic examination    Relevant Orders    Ambulatory Referral to Gynecology       Mental Health    Generalized anxiety disorder (Chronic)    Relevant Medications    sertraline (ZOLOFT) 50 MG tablet       Musculoskeletal and Injuries    Bilateral bunions (Chronic)    Chronic foot pain, right - Primary (Chronic)    Osteopenia of multiple sites    Overview     3/2022 DEXA showed osteopenia with the lowest T-score in the femoral neck at -2.4.  T-score of the spine was -2.1.  (Osteoporosis is -2.5 or less.)          Other Visit Diagnoses       Osteoporosis screening        Relevant Orders    DEXA Bone Density Axial    Anxiety        Relevant Medications    sertraline (ZOLOFT) 50 MG tablet            Exercising to Stay Healthy  To become healthy and stay healthy, it is recommended that you do moderate-intensity and vigorous-intensity exercise. You can tell that you are exercising at a moderate intensity if your heart starts beating faster and you start breathing faster but can still hold a conversation. You can tell that you are exercising at a vigorous intensity if you are breathing much harder and faster and cannot hold a conversation while exercising.  How can exercise benefit me?  Exercising regularly is important. It has many health benefits, such as:  Improving overall fitness, flexibility, and endurance.  Increasing bone density.  Helping with weight control.  Decreasing body fat.  Increasing muscle strength and endurance.  Reducing stress and tension, anxiety, depression, or anger.  Improving overall health.  What guidelines should I follow while exercising?  Before you start a  new exercise program, talk with your health care provider.  Do not exercise so much that you hurt yourself, feel dizzy, or get very short of breath.  Wear comfortable clothes and wear shoes with good support.  Drink plenty of water while you exercise to prevent dehydration or heat stroke.  Work out until your breathing and your heartbeat get faster (moderate intensity).  How often should I exercise?  Choose an activity that you enjoy, and set realistic goals. Your health care provider can help you make an activity plan that is individually designed and works best for you.  Exercise regularly as told by your health care provider. This may include:  Doing strength training two times a week, such as:  Lifting weights.  Using resistance bands.  Push-ups.  Sit-ups.  Yoga.  Doing a certain intensity of exercise for a given amount of time. Choose from these options:  A total of 150 minutes of moderate-intensity exercise every week.  A total of 75 minutes of vigorous-intensity exercise every week.  A mix of moderate-intensity and vigorous-intensity exercise every week.  Children, pregnant women, people who have not exercised regularly, people who are overweight, and older adults may need to talk with a health care provider about what activities are safe to perform. If you have a medical condition, be sure to talk with your health care provider before you start a new exercise program.  What are some exercise ideas?  Moderate-intensity exercise ideas include:  Walking 1 mile (1.6 km) in about 15 minutes.  Biking.  Hiking.  Golfing.  Dancing.  Water aerobics.  Vigorous-intensity exercise ideas include:  Walking 4.5 miles (7.2 km) or more in about 1 hour.  Jogging or running 5 miles (8 km) in about 1 hour.  Biking 10 miles (16.1 km) or more in about 1 hour.  Lap swimming.  Roller-skating or in-line skating.  Cross-country skiing.  Vigorous competitive sports, such as football, basketball, and soccer.  Jumping rope.  Aerobic  dancing.  What are some everyday activities that can help me get exercise?  Yard work, such as:  Pushing a .  Raking and bagging leaves.  Washing your car.  Pushing a stroller.  Shoveling snow.  Gardening.  Washing windows or floors.  How can I be more active in my day-to-day activities?  Use stairs instead of an elevator.  Take a walk during your lunch break.  If you drive, park your car farther away from your work or school.  If you take public transportation, get off one stop early and walk the rest of the way.  Stand up or walk around during all of your indoor phone calls.  Get up, stretch, and walk around every 30 minutes throughout the day.  Enjoy exercise with a friend. Support to continue exercising will help you keep a regular routine of activity.  Where to find more information  You can find more information about exercising to stay healthy from:  [place] Department of Health and Human Services: www.hhs.gov  Centers for Disease Control and Prevention (CDC): www.cdc.gov  Summary  Exercising regularly is important. It will improve your overall fitness, flexibility, and endurance.  Regular exercise will also improve your overall health. It can help you control your weight, reduce stress, and improve your bone density.  Do not exercise so much that you hurt yourself, feel dizzy, or get very short of breath.  Before you start a new exercise program, talk with your health care provider.  This information is not intended to replace advice given to you by your health care provider. Make sure you discuss any questions you have with your health care provider.  Document Revised: 04/15/2022 Document Reviewed: 04/15/2022  Elsevier Patient Education © 2023 [employer]

## 2024-06-19 NOTE — PROGRESS NOTES
[facility]     Jocelyn Yoon  1965   [id number]      Patient Care Team:  Michelle Stone MD as PCP - General (Internal Medicine)  Casie Fuentes DPM (Podiatry)    Chief Complaint   Patient presents with    Establish Care        Patient is a 59 y.o. female.   History of Present Illness  The patient is here for a new patient office visit.    The patient reports experiencing bilateral foot pain, with the right foot being more affected than the left. She has consulted with a podiatrist in the past few years, who did not express any concerns regarding her condition. Recently, she has experienced pain on the dorsal aspect of her foot.  She denies any trauma to the top of her foot, but notes mild discomfort during ambulation. The pain intensified over the past month, even upon waking in the morning.  She recently retired from teaching, she has been attempting to increase her physical activity by walking daily since the end of the school year. She denies any knee pain or hip pain. She occasionally takes Motrin, which provides some relief. She has not tried Voltaren gel and denies any gastrointestinal upset associated with ibuprofen.    The patient's cholesterol levels are elevated, but she denies any history of high cholesterol. She was prescribed rosuvastatin last fall, but she did not pick it up from the pharmacy. She is making efforts to improve her diet and walk more.    The patient is on a regimen of vitamin D and K2 supplements. She has not taken calcium supplements. Her diet includes Greek yogurt, but not daily. She does not consume milk.    The patient has never undergone a colonoscopy.  She did Cologuard test 12/2021.  She denies experiencing constipation, diarrhea, hematochezia, or abdominal pain.    The patient is scheduled for a mammogram at the end of 08/2023.         CHRONIC CONDITIONS      Past Medical History:   Diagnosis Date    Anxiety     Bunion     History of recurrent UTIs   "      Past Surgical History:   Procedure Laterality Date     SECTION      WISDOM TOOTH EXTRACTION         Family History   Problem Relation Age of Onset    Arthritis Mother     Hypertension Mother     Dementia Mother     Dementia Father     Prostate cancer Father     Heart disease Maternal Grandfather     Heart disease Paternal Grandfather     Breast cancer Neg Hx     Ovarian cancer Neg Hx        Social History     Socioeconomic History    Marital status:    Tobacco Use    Smoking status: Never    Smokeless tobacco: Never   Vaping Use    Vaping status: Never Used   Substance and Sexual Activity    Alcohol use: No    Drug use: No    Sexual activity: Yes     Partners: Male     Birth control/protection: Post-menopausal       No Known Allergies    Review of Systems:     Review of Systems    Vital Signs  Vitals:    24 1602   BP: 110/68   Pulse: 69   Resp: 14   Temp: 97.6 °F (36.4 °C)   TempSrc: Infrared   SpO2: 99%   Weight: 71.8 kg (158 lb 3.2 oz)   Height: 173.2 cm (68.19\")   PainSc: 0-No pain     Body mass index is 23.92 kg/m².  BMI is within normal parameters. No other follow-up for BMI required.          Current Outpatient Medications:     B Complex Vitamins (vitamin b complex) capsule capsule, Take  by mouth Daily., Disp: , Rfl:     Cholecalciferol 25 MCG (1000 UT) tablet, Take 1 tablet by mouth Daily., Disp: , Rfl:     Loratadine (CLARITIN PO), Take  by mouth Daily., Disp: , Rfl:     Omega-3 Fatty Acids (fish oil) 1000 MG capsule capsule, Take  by mouth Daily With Breakfast., Disp: , Rfl:     Probiotic Product (PROBIOTIC 10 ULTRA STRENGTH PO), Take  by mouth., Disp: , Rfl:     Progesterone Micronized (PROGESTERONE COMPOUNDING KIT TD), Place  on the skin as directed by provider. Progesterone DHEA Pregnenolone Bi-Est  50/15/5/0.5 ml cream, Disp: , Rfl:     sertraline (ZOLOFT) 50 MG tablet, Take 1 tablet by mouth Daily., Disp: 90 tablet, Rfl: 1    Diclofenac Sodium (VOLTAREN) 1 % gel gel, " Apply 4 g topically to the appropriate area as directed 4 (Four) Times a Day As Needed (joint pain)., Disp: 350 g, Rfl: 11    Physical Exam:    Physical Exam  Vitals and nursing note reviewed.   Constitutional:       Appearance: She is well-developed.   HENT:      Head: Normocephalic.   Eyes:      Conjunctiva/sclera: Conjunctivae normal.      Pupils: Pupils are equal, round, and reactive to light.   Neck:      Thyroid: No thyromegaly.   Cardiovascular:      Rate and Rhythm: Normal rate and regular rhythm.      Heart sounds: Normal heart sounds.   Pulmonary:      Effort: Pulmonary effort is normal.      Breath sounds: Normal breath sounds. No wheezing.   Musculoskeletal:         General: Normal range of motion.      Cervical back: Normal range of motion and neck supple.      Right foot: Normal range of motion. Bunion present.      Left foot: Normal range of motion. Bunion present.        Feet:    Feet:      Right foot:      Skin integrity: Skin integrity normal.      Left foot:      Skin integrity: Skin integrity normal.      Comments: Bunions.  Right worse than left.  Area of pain right dorsal midfoot without swelling or redness or tenderness.  Lymphadenopathy:      Cervical: No cervical adenopathy.   Skin:     General: Skin is warm and dry.   Neurological:      Mental Status: She is alert and oriented to person, place, and time.   Psychiatric:         Attention and Perception: Attention normal.         Mood and Affect: Mood normal.         Speech: Speech normal.         Thought Content: Thought content normal.          ACE III MINI        Results Review:    I reviewed the patient's new clinical results.  Results  Laboratory Studies  LDL cholesterol was 147. HDL cholesterol was 77. Vitamin D level was 41.    Imaging  Bone density test in 2022 showed moderate osteopenia with a T score in the spine and femoral neck, -2.1, left hip and spine, and -2.4 in the right hip.       CMP:  Lab Results   Component Value Date     "BUN 16 10/05/2023    CREATININE 0.79 10/05/2023    EGFRIFNONA 72 12/21/2021    BCR 20.3 10/05/2023     10/05/2023    K 4.3 10/05/2023    CO2 24.0 10/05/2023    CALCIUM 9.6 10/05/2023    ALBUMIN 4.8 10/05/2023    BILITOT 0.4 10/05/2023    ALKPHOS 48 10/05/2023    AST 25 10/05/2023    ALT 14 10/05/2023     HbA1c:  No results found for: \"HGBA1C\"  Microalbumin:  No results found for: \"MICROALBUR\", \"POCMALB\", \"POCCREAT\"  Lipid Panel  Lab Results   Component Value Date    CHOL 234 (H) 10/05/2023    TRIG 58 10/05/2023    HDL 77 (H) 10/05/2023     (H) 10/05/2023    AST 25 10/05/2023    ALT 14 10/05/2023       Medication Review: Medications reviewed and noted  Patient Instructions   Problem List Items Addressed This Visit          Allergies and Adverse Reactions    Perennial allergic rhinitis (Chronic)       Cardiac and Vasculature    Hypercholesterolemia (Chronic)    Overview     LDL in 2022 was 168, in 10/2023 it was 147.  Normal HDL.            Genitourinary and Reproductive     Visit for gynecologic examination    Relevant Orders    Ambulatory Referral to Gynecology       Mental Health    Generalized anxiety disorder (Chronic)    Relevant Medications    sertraline (ZOLOFT) 50 MG tablet       Musculoskeletal and Injuries    Bilateral bunions (Chronic)    Chronic foot pain, right - Primary (Chronic)    Osteopenia of multiple sites    Overview     3/2022 DEXA showed osteopenia with the lowest T-score in the femoral neck at -2.4.  T-score of the spine was -2.1.  (Osteoporosis is -2.5 or less.)          Other Visit Diagnoses       Osteoporosis screening        Relevant Orders    DEXA Bone Density Axial    Anxiety        Relevant Medications    sertraline (ZOLOFT) 50 MG tablet               Diagnosis Plan   1. Chronic foot pain, right        2. Osteopenia of multiple sites        3. Hypercholesterolemia        4. Bilateral bunions        5. Generalized anxiety disorder        6. Perennial allergic rhinitis      "   7. Osteoporosis screening  DEXA Bone Density Axial      8. Visit for gynecologic examination  Ambulatory Referral to Gynecology      9. Anxiety  sertraline (ZOLOFT) 50 MG tablet        Assessment & Plan  1. Chronic foot pain, right.  The patient may have sustained a foot strain. The patient is advised to take ibuprofen with food as needed or apply Voltaren gel topically four times daily as needed. Continuation of supportive footwear is advised. Should the pain intensify, the patient is advised to inform us.    2. Osteopenia.  The patient will persist with daily vitamin D3 supplementation. Additionally, calcium carbonate or calcium citrate 500 to 600 mg twice daily is recommended. Weightbearing exercises are recommended to preserve bone density, along with regular walking. Small hand weights should be used while relaxing at home. A class of Pilates, Sheldon Chi, or similar exercises may also be considered. A recheck of bone density is scheduled for 08/2024, potentially during her mammogram.    3. Hypercholesterolemia.  The patient is advised to maintain regular walking and exercise, adhere to a low-fat, low-carbohydrate diet, and incorporate protein thrice daily. Weight maintenance is encouraged. Fasting cholesterol will be rechecked in the fall.    4. Bilateral bunions.  The patient will persist with supportive footwear.    5. Generalized anxiety.  The current dose of sertraline will be continued.    6. Perennial allergic rhinitis.  The patient is advised to continue with loratadine daily as needed.    Follow-up  The patient is scheduled for an annual physical in 10/2024.     I spent 45 minutes caring for Jocelyn on this date of service. This time includes time spent by me in the following activities:preparing for the visit, reviewing tests, performing a medically appropriate examination and/or evaluation , counseling and educating the patient/family/caregiver, ordering medications, tests, or procedures, and documenting  information in the medical record    Plan of care reviewed with patient at the conclusion of today's visit. Education was provided regarding diagnosis, management, and any prescribed or recommended OTC medications. Patient verbalizes understanding of and agreement with management plan.         06/19/24   08:21 EDT    Patient or patient representative verbalized consent for the use of Ambient Listening during the visit with  Michelle Stone MD for chart documentation. 6/19/2024  17:32 EDT

## 2024-06-20 ENCOUNTER — TELEPHONE (OUTPATIENT)
Dept: INTERNAL MEDICINE | Facility: CLINIC | Age: 59
End: 2024-06-20
Payer: COMMERCIAL

## 2024-06-26 ENCOUNTER — PATIENT ROUNDING (BHMG ONLY) (OUTPATIENT)
Dept: INTERNAL MEDICINE | Facility: CLINIC | Age: 59
End: 2024-06-26
Payer: COMMERCIAL

## 2024-07-17 ENCOUNTER — OFFICE VISIT (OUTPATIENT)
Dept: INTERNAL MEDICINE | Facility: CLINIC | Age: 59
End: 2024-07-17
Payer: COMMERCIAL

## 2024-07-17 ENCOUNTER — HOSPITAL ENCOUNTER (OUTPATIENT)
Dept: GENERAL RADIOLOGY | Facility: HOSPITAL | Age: 59
Discharge: HOME | End: 2024-07-17
Admitting: NURSE PRACTITIONER
Payer: COMMERCIAL

## 2024-07-17 VITALS
HEART RATE: 77 BPM | WEIGHT: 157.8 LBS | SYSTOLIC BLOOD PRESSURE: 110 MMHG | HEIGHT: 68 IN | OXYGEN SATURATION: 98 % | TEMPERATURE: 98.2 F | BODY MASS INDEX: 23.92 KG/M2 | DIASTOLIC BLOOD PRESSURE: 82 MMHG

## 2024-07-17 DIAGNOSIS — M25.531 RIGHT WRIST PAIN: ICD-10-CM

## 2024-07-17 DIAGNOSIS — M85.89 OSTEOPENIA OF MULTIPLE SITES: ICD-10-CM
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
## 2024-07-17 DIAGNOSIS — R07.81 RIB PAIN ON LEFT SIDE: ICD-10-CM

## 2024-07-17 DIAGNOSIS — M25.561 ACUTE PAIN OF RIGHT KNEE: ICD-10-CM

## 2024-07-17 PROCEDURE — 71046 X-RAY EXAM CHEST 2 VIEWS: CPT

## 2024-07-17 PROCEDURE — 99214 OFFICE O/P EST MOD 30 MIN: CPT | Performed by: NURSE PRACTITIONER

## 2024-07-17 RX ORDER — IBUPROFEN 600 MG/1
600 TABLET ORAL EVERY 8 HOURS PRN
Qty: 30 TABLET | Refills: 0 | Status: SHIPPED | OUTPATIENT
Start: 2024-07-17

## 2024-07-17 RX ORDER — CYCLOBENZAPRINE HCL 10 MG
10 TABLET ORAL 2 TIMES DAILY PRN
Qty: 30 TABLET | Refills: 0 | Status: SHIPPED | OUTPATIENT
Start: 2024-07-17

## 2024-07-17 NOTE — ASSESSMENT & PLAN NOTE
-She reports improvement  - X-ray ordered.  - NSAIDs for pain, continue stretching and range of motion exercises.

## 2024-07-17 NOTE — ASSESSMENT & PLAN NOTE
-Dime size healing scabbed abrasion.  - Offered physical therapy and x-ray.  She will wait another month to see if knee pain improves.  She was scheduled an appointment with her PCP in 4 weeks with no improvement.  -Advised to apply ice, stretch and NSAIDs for pain relief.  -Keep abrasion clean using soap and water and triple antibiotic ointment cream.

## 2024-07-17 NOTE — PROGRESS NOTES
Office Note     Name: Jocelyn Yoon    : 1965     MRN: [id number]     Chief Complaint  Fall    Subjective     History of Present Illness:  Jocelyn Yoon is a 59 y.o. female who presents today for injury sustained during a fall 10 days ago.  She reports her foot got hung up on a bed rail and she fell on the floor landing on her left wrist.  She has left wrist pain along the forearm into the thumb, pain on the left side of her ribs and right knee pain walking up and down stairs and when stepping at times.      She reports initial bruising around left breast and left ribs as well as bruising of left wrist.  The bruising has healed and no bruising during today's exam.  She does have a scabbed abrasion on her right knee.  She is taken Tylenol and Motrin with little relief.  Has not applied ice to wrist or knee.  Has full range of motion of wrist and knee.    She is concerned she has fractured her ribs in the past and punctured her lung and ended up in the hospital so would like to ensure everything is okay.  She also expresses concern that she may have osteoporosis.    Left chest wall pain when taking deep breath or cough.    She denies hitting her head or losing consciousness, shortness of breath, difficulty breathing  with activity or rest, fever.  She has not applied any heat or ice to injured areas.        Past Medical History:   Past Medical History:   Diagnosis Date    Anxiety     Bunion     History of medical problems Dementia- both parents    History of recurrent UTIs        Past Surgical History:   Past Surgical History:   Procedure Laterality Date     SECTION      WISDOM TOOTH EXTRACTION         Family History:   Family History   Problem Relation Age of Onset    Arthritis Mother     Hypertension Mother     Dementia Mother     Dementia Father     Prostate cancer Father     Heart disease Maternal Grandfather     Heart disease Paternal Grandfather     Breast cancer Neg Hx     Ovarian cancer  Neg Hx        Social History:   Social History     Socioeconomic History    Marital status:    Tobacco Use    Smoking status: Never    Smokeless tobacco: Never   Vaping Use    Vaping status: Never Used   Substance and Sexual Activity    Alcohol use: No    Drug use: No    Sexual activity: Yes     Partners: Male     Birth control/protection: Post-menopausal       Immunizations:   Immunization History   Administered Date(s) Administered    COVID-19 (PFIZER) Purple Cap Monovalent 01/21/2021, 02/12/2021    Flu Vaccine Intradermal Quad 18-64YR 10/13/2021    Flu Vaccine Quad PF >36MO 11/16/2020    Fluzone (or Fluarix & Flulaval for VFC) >6mos 11/16/2020, 10/05/2023    Influenza Seasonal Injectable 10/13/2021        Medications:     Current Outpatient Medications:     B Complex Vitamins (vitamin b complex) capsule capsule, Take  by mouth Daily., Disp: , Rfl:     Cholecalciferol 25 MCG (1000 UT) tablet, Take 1 tablet by mouth Daily., Disp: , Rfl:     Diclofenac Sodium (VOLTAREN) 1 % gel gel, Apply 4 g topically to the appropriate area as directed 4 (Four) Times a Day As Needed (joint pain)., Disp: 350 g, Rfl: 11    Loratadine (CLARITIN PO), Take  by mouth Daily., Disp: , Rfl:     Omega-3 Fatty Acids (fish oil) 1000 MG capsule capsule, Take  by mouth Daily With Breakfast., Disp: , Rfl:     Probiotic Product (PROBIOTIC 10 ULTRA STRENGTH PO), Take  by mouth., Disp: , Rfl:     Progesterone Micronized (PROGESTERONE COMPOUNDING KIT TD), Place  on the skin as directed by provider. Progesterone DHEA Pregnenolone Bi-Est  50/15/5/0.5 ml cream, Disp: , Rfl:     sertraline (ZOLOFT) 50 MG tablet, Take 1 tablet by mouth Daily., Disp: 90 tablet, Rfl: 1    cyclobenzaprine (FLEXERIL) 10 MG tablet, Take 1 tablet by mouth 2 (Two) Times a Day As Needed for Muscle Spasms., Disp: 30 tablet, Rfl: 0    ibuprofen (ADVIL,MOTRIN) 600 MG tablet, Take 1 tablet by mouth Every 8 (Eight) Hours As Needed for Mild Pain., Disp: 30 tablet, Rfl:  "0    Allergies:   No Known Allergies    Objective     Vital Signs  /82 (BP Location: Left arm, Patient Position: Sitting, Cuff Size: Adult)   Pulse 77   Temp 98.2 °F (36.8 °C) (Infrared)   Ht 173.2 cm (68.19\")   Wt 71.6 kg (157 lb 12.8 oz)   SpO2 98%   BMI 23.86 kg/m²   Estimated body mass index is 23.86 kg/m² as calculated from the following:    Height as of this encounter: 173.2 cm (68.19\").    Weight as of this encounter: 71.6 kg (157 lb 12.8 oz).    BMI is within normal parameters. No other follow-up for BMI required.       Physical Exam  Vitals and nursing note reviewed.   Constitutional:       General: She is not in acute distress.     Appearance: Normal appearance.   HENT:      Head: Normocephalic and atraumatic.   Eyes:      Conjunctiva/sclera: Conjunctivae normal.   Cardiovascular:      Rate and Rhythm: Normal rate and regular rhythm.      Pulses: Normal pulses.           Radial pulses are 2+ on the right side and 2+ on the left side.      Heart sounds: Normal heart sounds.   Pulmonary:      Effort: Pulmonary effort is normal.      Breath sounds: Normal breath sounds and air entry. No wheezing, rhonchi or rales.   Chest:      Chest wall: Tenderness present. No swelling or crepitus.   Breasts:     Left: Normal.          Comments: Slight tenderness when palpated.  Abdominal:      General: Bowel sounds are normal.      Palpations: Abdomen is soft.   Musculoskeletal:      Left wrist: No swelling, bony tenderness or crepitus. Normal range of motion. Normal pulse.      Cervical back: Normal range of motion. No pain with movement.      Right knee: No swelling, effusion, erythema or crepitus. Normal range of motion. No tenderness.   Skin:     General: Skin is warm and dry.      Findings: Abrasion (right knee, healing with scab present.) present.   Neurological:      General: No focal deficit present.      Mental Status: She is alert.      Coordination: Coordination is intact.      Gait: Gait is intact. "            Procedures     Results:  No results found for this or any previous visit (from the past 24 hour(s)).     Assessment and Plan     Assessment/Plan:  Diagnoses and all orders for this visit:    1. Fall, initial encounter (Primary)  Assessment & Plan:  - Patient lost footing when moving furniture.  - Provide education on ensuring floors are cleared and do not have any rugs or carpets that people can trip on.    Orders:  -     XR Chest PA & Lateral; Future  -     XR Wrist 3+ View Left; Future    2. Acute pain of right knee  Assessment & Plan:  -Dime size healing scabbed abrasion.  - Offered physical therapy and x-ray.  She will wait another month to see if knee pain improves.  She was scheduled an appointment with her PCP in 4 weeks with no improvement.  -Advised to apply ice, stretch and NSAIDs for pain relief.  -Keep abrasion clean using soap and water and triple antibiotic ointment cream.    Orders:  -     ibuprofen (ADVIL,MOTRIN) 600 MG tablet; Take 1 tablet by mouth Every 8 (Eight) Hours As Needed for Mild Pain.  Dispense: 30 tablet; Refill: 0    3. Right wrist pain  Assessment & Plan:  -She reports improvement  - X-ray ordered.  - NSAIDs for pain, continue stretching and range of motion exercises.    Orders:  -     ibuprofen (ADVIL,MOTRIN) 600 MG tablet; Take 1 tablet by mouth Every 8 (Eight) Hours As Needed for Mild Pain.  Dispense: 30 tablet; Refill: 0    4. Rib pain on left side  Assessment & Plan:  - No bruising along left breast or ribs.  -X-ray ordered  - Instructed to take muscle relaxer as needed and NSAIDs for pain relief.  - Instructed to splint ribs with coughing, sneezing, taking deep breaths.    Orders:  -     cyclobenzaprine (FLEXERIL) 10 MG tablet; Take 1 tablet by mouth 2 (Two) Times a Day As Needed for Muscle Spasms.  Dispense: 30 tablet; Refill: 0  -     ibuprofen (ADVIL,MOTRIN) 600 MG tablet; Take 1 tablet by mouth Every 8 (Eight) Hours As Needed for Mild Pain.  Dispense: 30 tablet;  Refill: 0    5. Osteopenia of multiple sites  Overview:  3/2022 DEXA showed osteopenia with the lowest T-score in the femoral neck at -2.4.  T-score of the spine was -2.1.  (Osteoporosis is -2.5 or less.)    Assessment & Plan:  -Provided education about osteopenia.  - She reports she has started taking vitamin D and calcium supplements daily.  -She is due to be scheduled for her next DEXA scan this year.          Follow Up  Return for Next scheduled follow up.     I 30 spent blank minutes caring for patient on this date of service.  This time includes time spent by me in the following activities: Preparing for the visit, reviewing tests, obtaining and/or reviewing a separately obtained history, performing a medically appropriate examination and for evaluation, counseling and educating the patient\family\caregiver, ordering medications, test, or procedures, referring and communicating with other healthcare professionals and documenting information in the medical record.    MARIANO Frias   [facility] [place] [address]

## 2024-07-17 NOTE — ASSESSMENT & PLAN NOTE
-Provided education about osteopenia.  - She reports she has started taking vitamin D and calcium supplements daily.  -She is due to be scheduled for her next DEXA scan this year.

## 2024-07-17 NOTE — ASSESSMENT & PLAN NOTE
- No bruising along left breast or ribs.  -X-ray ordered  - Instructed to take muscle relaxer as needed and NSAIDs for pain relief.  - Instructed to splint ribs with coughing, sneezing, taking deep breaths.

## 2024-07-17 NOTE — ASSESSMENT & PLAN NOTE
- Patient lost footing when moving furniture.  - Provide education on ensuring floors are cleared and do not have any rugs or carpets that people can trip on.

## 2024-07-18 RX ORDER — AMOXICILLIN AND CLAVULANATE POTASSIUM 875; 125 MG/1; MG/1
1 TABLET, FILM COATED ORAL 2 TIMES DAILY
Qty: 20 TABLET | Refills: 0 | Status: SHIPPED | OUTPATIENT
Start: 2024-07-18

## 2024-07-18 RX ORDER — BENZONATATE 200 MG/1
200 CAPSULE ORAL 2 TIMES DAILY PRN
Qty: 30 CAPSULE | Refills: 0 | Status: SHIPPED | OUTPATIENT
Start: 2024-07-18

## 2024-08-29 ENCOUNTER — TRANSCRIBE ORDERS (OUTPATIENT)
Dept: ADMINISTRATIVE | Facility: HOSPITAL | Age: 59
End: 2024-08-29
Payer: COMMERCIAL

## 2024-08-29 DIAGNOSIS — Z12.31 VISIT FOR SCREENING MAMMOGRAM: Primary | ICD-10-CM

## 2024-10-09 ENCOUNTER — TELEPHONE (OUTPATIENT)
Dept: INTERNAL MEDICINE | Facility: CLINIC | Age: 59
End: 2024-10-09
Payer: COMMERCIAL

## 2024-10-09 NOTE — TELEPHONE ENCOUNTER
Caller: Jocelyn Yoon    Relationship: Self    Best call back number:[phone]     What is the best time to reach you: ANY    Who are you requesting to speak with (clinical staff, provider,  specific staff member): CLINICAL STAFF    Do you know the name of the person who called: SELF    What was the call regarding: PATIENT WANTED TO LET PCP KNOW SHE NO LONGER NEEDS A WRIST X-RAY. PLEASE CALL IF YOU HAVE ANY QUESTIONS.

## 2025-01-09 ENCOUNTER — TELEPHONE (OUTPATIENT)
Dept: INTERNAL MEDICINE | Facility: CLINIC | Age: 60
End: 2025-01-09
Payer: COMMERCIAL

## 2025-01-09 ENCOUNTER — TELEPHONE (OUTPATIENT)
Dept: OBSTETRICS AND GYNECOLOGY | Facility: CLINIC | Age: 60
End: 2025-01-09

## 2025-01-09 ENCOUNTER — LAB (OUTPATIENT)
Dept: LAB | Facility: HOSPITAL | Age: 60
End: 2025-01-09
Payer: COMMERCIAL

## 2025-01-09 ENCOUNTER — OFFICE VISIT (OUTPATIENT)
Dept: OBSTETRICS AND GYNECOLOGY | Facility: CLINIC | Age: 60
End: 2025-01-09
Payer: COMMERCIAL

## 2025-01-09 VITALS
SYSTOLIC BLOOD PRESSURE: 124 MMHG | DIASTOLIC BLOOD PRESSURE: 72 MMHG | WEIGHT: 172 LBS | BODY MASS INDEX: 26.07 KG/M2 | HEIGHT: 68 IN

## 2025-01-09 DIAGNOSIS — Z01.419 PAP TEST, AS PART OF ROUTINE GYNECOLOGICAL EXAMINATION: ICD-10-CM

## 2025-01-09 DIAGNOSIS — E55.9 VITAMIN D DEFICIENCY: ICD-10-CM

## 2025-01-09 DIAGNOSIS — N95.2 VAGINAL ATROPHY: ICD-10-CM

## 2025-01-09 DIAGNOSIS — Z01.411 ENCOUNTER FOR GYNECOLOGICAL EXAMINATION WITH ABNORMAL FINDING: Primary | ICD-10-CM

## 2025-01-09 DIAGNOSIS — N39.41 URGE INCONTINENCE: ICD-10-CM

## 2025-01-09 DIAGNOSIS — Z12.11 COLON CANCER SCREENING: ICD-10-CM

## 2025-01-09 DIAGNOSIS — E78.00 HYPERCHOLESTEROLEMIA: ICD-10-CM

## 2025-01-09 LAB
25(OH)D3 SERPL-MCNC: 42.6 NG/ML (ref 30–100)
ALBUMIN SERPL-MCNC: 4.3 G/DL (ref 3.5–5.2)
ALBUMIN/GLOB SERPL: 1.5 G/DL
ALP SERPL-CCNC: 69 U/L (ref 39–117)
ALT SERPL W P-5'-P-CCNC: 15 U/L (ref 1–33)
ANION GAP SERPL CALCULATED.3IONS-SCNC: 10.1 MMOL/L (ref 5–15)
AST SERPL-CCNC: 21 U/L (ref 1–32)
BASOPHILS # BLD AUTO: 0.06 10*3/MM3 (ref 0–0.2)
BASOPHILS NFR BLD AUTO: 1.1 % (ref 0–1.5)
BILIRUB SERPL-MCNC: 0.4 MG/DL (ref 0–1.2)
BUN SERPL-MCNC: 16 MG/DL (ref 6–20)
BUN/CREAT SERPL: 17.2 (ref 7–25)
CALCIUM SPEC-SCNC: 9.6 MG/DL (ref 8.6–10.5)
CHLORIDE SERPL-SCNC: 104 MMOL/L (ref 98–107)
CHOLEST SERPL-MCNC: 269 MG/DL (ref 0–200)
CO2 SERPL-SCNC: 25.9 MMOL/L (ref 22–29)
CREAT SERPL-MCNC: 0.93 MG/DL (ref 0.57–1)
DEPRECATED RDW RBC AUTO: 39.3 FL (ref 37–54)
EGFRCR SERPLBLD CKD-EPI 2021: 71.8 ML/MIN/1.73
EOSINOPHIL # BLD AUTO: 0.11 10*3/MM3 (ref 0–0.4)
EOSINOPHIL NFR BLD AUTO: 2 % (ref 0.3–6.2)
ERYTHROCYTE [DISTWIDTH] IN BLOOD BY AUTOMATED COUNT: 12.2 % (ref 12.3–15.4)
GLOBULIN UR ELPH-MCNC: 2.9 GM/DL
GLUCOSE SERPL-MCNC: 92 MG/DL (ref 65–99)
HCT VFR BLD AUTO: 39.9 % (ref 34–46.6)
HDLC SERPL-MCNC: 78 MG/DL (ref 40–60)
HGB BLD-MCNC: 13 G/DL (ref 12–15.9)
IMM GRANULOCYTES # BLD AUTO: 0.01 10*3/MM3 (ref 0–0.05)
IMM GRANULOCYTES NFR BLD AUTO: 0.2 % (ref 0–0.5)
LDLC SERPL CALC-MCNC: 177 MG/DL (ref 0–100)
LDLC/HDLC SERPL: 2.24 {RATIO}
LYMPHOCYTES # BLD AUTO: 1.56 10*3/MM3 (ref 0.7–3.1)
LYMPHOCYTES NFR BLD AUTO: 27.7 % (ref 19.6–45.3)
MCH RBC QN AUTO: 28.9 PG (ref 26.6–33)
MCHC RBC AUTO-ENTMCNC: 32.6 G/DL (ref 31.5–35.7)
MCV RBC AUTO: 88.7 FL (ref 79–97)
MONOCYTES # BLD AUTO: 0.54 10*3/MM3 (ref 0.1–0.9)
MONOCYTES NFR BLD AUTO: 9.6 % (ref 5–12)
NEUTROPHILS NFR BLD AUTO: 3.36 10*3/MM3 (ref 1.7–7)
NEUTROPHILS NFR BLD AUTO: 59.4 % (ref 42.7–76)
NRBC BLD AUTO-RTO: 0 /100 WBC (ref 0–0.2)
PLATELET # BLD AUTO: 267 10*3/MM3 (ref 140–450)
PMV BLD AUTO: 10.6 FL (ref 6–12)
POTASSIUM SERPL-SCNC: 4.2 MMOL/L (ref 3.5–5.2)
PROT SERPL-MCNC: 7.2 G/DL (ref 6–8.5)
RBC # BLD AUTO: 4.5 10*6/MM3 (ref 3.77–5.28)
SODIUM SERPL-SCNC: 140 MMOL/L (ref 136–145)
TRIGL SERPL-MCNC: 83 MG/DL (ref 0–150)
TSH SERPL DL<=0.05 MIU/L-ACNC: 3.1 UIU/ML (ref 0.27–4.2)
VIT B12 BLD-MCNC: 383 PG/ML (ref 211–946)
VLDLC SERPL-MCNC: 14 MG/DL (ref 5–40)
WBC NRBC COR # BLD AUTO: 5.64 10*3/MM3 (ref 3.4–10.8)

## 2025-01-09 PROCEDURE — 80050 GENERAL HEALTH PANEL: CPT

## 2025-01-09 PROCEDURE — 82306 VITAMIN D 25 HYDROXY: CPT

## 2025-01-09 PROCEDURE — 82607 VITAMIN B-12: CPT

## 2025-01-09 PROCEDURE — 80061 LIPID PANEL: CPT

## 2025-01-09 RX ORDER — ESTRADIOL 10 UG/1
1 INSERT VAGINAL DAILY
Qty: 14 EACH | Refills: 0 | Status: SHIPPED | OUTPATIENT
Start: 2025-01-09

## 2025-01-09 RX ORDER — ESTRADIOL 10 UG/1
1 INSERT VAGINAL 2 TIMES WEEKLY
Qty: 8 EACH | Refills: 12 | Status: SHIPPED | OUTPATIENT
Start: 2025-01-09 | End: 2026-01-09

## 2025-01-09 RX ORDER — CALCIUM CARBONATE/VITAMIN D3 500-10/5ML
LIQUID (ML) ORAL
COMMUNITY
Start: 2025-01-02

## 2025-01-09 NOTE — TELEPHONE ENCOUNTER
Provider: DR DIAZ    Caller: [facility] [place], [place] - [address] [facility] [phone] [facility] [phone] FX    Relationship to Patient: Pharmacy        Reason for Call: IVELISSE IS CALLING FROM PHARMACY REGARDING THE PRESCRIPTION FOR Estradiol Starter Pack (Imvexxy Starter Pack) 10 MCG insert  - THE QUANTITY AND THE DIRECTIONS DO NOT MATCH - USUALLY THE STARTER PACK COMES WITH 18 AND THE PRESCRIPTION IS FOR 14 PLEASE CALL TO ADVISE

## 2025-01-09 NOTE — TELEPHONE ENCOUNTER
Problem: Potential for hypothermia related to immature thermoregulation  Goal: Troy will maintain body temperature between 97.6 degrees axillary F and 99.6 degrees axillary F in an open crib  Outcome: PROGRESSING AS EXPECTED  Infants temperature within normal limits while bundled in an open crib    Problem: Potential for impaired gas exchange  Goal: Patient will not exhibit signs/symptoms of respiratory distress  Outcome: PROGRESSING AS EXPECTED  Infant remains free from signs of respiratory distress       Pt verbalized understanding.

## 2025-01-09 NOTE — TELEPHONE ENCOUNTER
Caller: Jocelyn Yoon    Relationship: Self    Best call back number: [phone]     What is the best time to reach you: ANYTIME     Who are you requesting to speak with (clinical staff, provider,  specific staff member): CLINICAL STAFF    What was the call regarding: PATIENT IS CALLING TO SEE IF SHE CAN GET AN ORDER FOR LABS TO BE DONE BEFORE HER UPCOMING PHYSICAL.     Is it okay if the provider responds through [employer]: YES

## 2025-01-09 NOTE — PROGRESS NOTES
Gynecologic Annual Exam Note        CC - Here for Annual Exam.     Subjective     HPI  Jocelyn Yoon is a 59 y.o. female, , who presents for annual well woman exam.  She is postmenopausal.  Patient reports problems with: none.      Partner Status: Marital Status: .  New Partners since last visit: no.   She Denies vasomotor symptoms.  She reports issues with urinary incontinence. She doesn't really have NATALIE, but notes more urge symptoms.  Does have vaginal dryness.       She has concerns about domestic violence: no.    Additional OB/GYN History     History of abnormal Pap smear: no  Family history of uterine, colon, breast, or ovarian cancer: no  Performs monthly Self-Breast Exam: yes  Exercises Regularly: yes  Feelings of Anxiety or Depression: no    Last Pap : several years ago per pt.   Last Completed Pap Smear       This patient has no relevant Health Maintenance data.          Last mammogram: 2022  Last Completed Mammogram       This patient has no relevant Health Maintenance data.          Last colonoscopy: cologuard 3 years ago     Last Completed Colonoscopy       This patient has no relevant Health Maintenance data.          Last DEXA: 3/28/2022; osteopenia     Tobacco Usage?: No   OB History          1    Para   1    Term   1       0    AB   0    Living   1         SAB   0    IAB   0    Ectopic   0    Molar   0    Multiple   0    Live Births   1                Health Maintenance   Topic Date Due    MOST FORM  Never done    TDAP/TD VACCINES (1 - Tdap) Never done    ZOSTER VACCINE (1 of 2) Never done    BMI FOLLOWUP  2023    PAP SMEAR  08/10/2023    MAMMOGRAM  2024    INFLUENZA VACCINE  2024    COVID-19 Vaccine (3 - 2024-25 season) 2024    ANNUAL PHYSICAL  10/05/2024    LIPID PANEL  10/05/2024    Annual Gynecologic Pelvic and Breast Exam  10/06/2024    COLORECTAL CANCER SCREENING  2024    HEPATITIS C SCREENING  Completed    Pneumococcal Vaccine  "0-64  Aged Out       The additional following portions of the patient's history were reviewed and updated as appropriate: allergies, current medications, past family history, past medical history, past social history, past surgical history, and problem list.    Past Medical History:   Diagnosis Date    Anxiety     Bunion     Elevated LDL cholesterol level 10/2023    History of fall     History of recurrent UTIs     Osteopenia     : L1-L4 vertebrae, femoral necks bilaterally, and total hips bilaterally    Varicella 1984        Past Surgical History:   Procedure Laterality Date     SECTION      WISDOM TOOTH EXTRACTION          Review of Systems   Constitutional: Negative.    HENT: Negative.     Eyes: Negative.    Respiratory: Negative.     Cardiovascular: Negative.    Gastrointestinal: Negative.    Endocrine: Negative.    Genitourinary: Negative.         Vasomotor   NATALIE   Musculoskeletal: Negative.    Skin: Negative.    Allergic/Immunologic: Negative.    Neurological: Negative.    Hematological: Negative.    Psychiatric/Behavioral: Negative.         I have reviewed and agree with the HPI, ROS, and historical information as entered above. Alia Fishman MD    Objective   /72   Ht 172.7 cm (68\")   Wt 78 kg (172 lb)   BMI 26.15 kg/m²     Physical Exam  Vitals and nursing note reviewed. Exam conducted with a chaperone present.   Constitutional:       General: She is awake.   HENT:      Head: Normocephalic and atraumatic.   Neck:      Thyroid: No thyroid mass, thyromegaly or thyroid tenderness.   Cardiovascular:      Rate and Rhythm: Normal rate.      Heart sounds: No murmur heard.     No friction rub. No gallop.   Pulmonary:      Effort: Pulmonary effort is normal.      Breath sounds: Normal breath sounds. No stridor. No wheezing, rhonchi or rales.   Chest:      Chest wall: No mass or tenderness.   Breasts:     Right: Normal. No bleeding, inverted nipple, mass, nipple discharge, skin change or " tenderness.      Left: Normal. No bleeding, inverted nipple, mass, nipple discharge, skin change or tenderness.   Abdominal:      Palpations: Abdomen is soft.      Tenderness: There is no guarding or rebound.      Hernia: There is no hernia in the left inguinal area or right inguinal area.   Genitourinary:     General: Normal vulva.      Pubic Area: No rash.       Labia:         Right: No rash, tenderness, lesion or injury.         Left: No rash, tenderness, lesion or injury.       Urethra: No prolapse, urethral swelling or urethral lesion.      Vagina: No foreign body. No vaginal discharge, erythema, tenderness, bleeding or lesions.      Cervix: No cervical motion tenderness, discharge, friability, lesion, erythema or cervical bleeding.      Uterus: Normal. Not deviated, not enlarged, not fixed and not tender.       Adnexa: Right adnexa normal and left adnexa normal.        Right: No mass, tenderness or fullness.          Left: No mass, tenderness or fullness.        Rectum: No external hemorrhoid.   Musculoskeletal:      Cervical back: Normal range of motion.   Lymphadenopathy:      Upper Body:      Right upper body: No supraclavicular or axillary adenopathy.      Left upper body: No supraclavicular or axillary adenopathy.   Skin:     General: Skin is warm.   Neurological:      Mental Status: She is alert and oriented to person, place, and time.   Psychiatric:         Mood and Affect: Mood and affect normal.         Speech: Speech normal.         Assessment & Plan     Assessment     Problem List Items Addressed This Visit    None  Visit Diagnoses       Encounter for gynecological examination with abnormal finding    -  Primary    Pap test, as part of routine gynecological examination        Relevant Orders    LIQUID-BASED PAP SMEAR WITH HPV GENOTYPING REGARDLESS OF INTERPRETATION (JERI,COR,MAD)    Urge incontinence        Relevant Orders    Urinalysis With Microscopic - Urine, Clean Catch    Urine Culture - Urine,  Urine, Clean Catch    Vaginal atrophy        Relevant Medications    Estradiol (Imvexxy Maintenance Pack) 10 MCG insert    Estradiol Starter Pack (Imvexxy Starter Pack) 10 MCG insert    Colon cancer screening        Relevant Orders    Cologuard - Stool, Per Rectum            Plan     Reviewed monthly self breast exams.  Instructed to call with lumps, pain, or breast discharge.  Yearly mammograms ordered.  Ordered mammogram today.  Recommended use of Vitamin D and getting adequate calcium in her diet. (1500mg)  Reviewed exercise as a preventative health measures.   RTC in 1 year or PRN with problems.  Pt already has order for mammogram in system.  Patient was counseled that evidence is lacking to support superiority claims of compounded bioidentical hormones over conventional FDA approved menopausal hormone therapy.  Compounded or “customized” hormones pose additional risks above those known for FDA approved therapies, as compounded hormones have variable purity and potency.  This variability and lack of regulation in these therapies make both under dosage and over dosage possible.  Despite claims to the contrary, evidence is inadequate to support increased efficacy or safety for individualized hormone therapy regimen based on salivary, serum or urinary testing.  Any side effects such as vaginal bleeding or pain should be reported immediately.     Patient counseled that hormone treatment should be used to help with specific symptoms related to menopause such as hot flashes, night sweats and vaginal atrophy.   The lowest dose hormone that treats symptoms should be used for the shortest amount of time possible.  Although estrogen is the most effective treatment for hot flashes, other non hormonal options exist (such as Veozah, Brisdelle or venlafaxine) and should also be considered.  Anyone with an intact uterus should also receive progestogen to prevent uterine overgrowth that can lead to uterine cancer.  All hormone  therapy, whether it is synthetic or bio identical, can lead to increased risk of thromboembolic diseases such as DVT, pulmonary embolism, stroke, heart attack and death.  These adverse events are more likely to develop in the first year of use and in patients who are older than the typical menopausal age.  Risks of estrogen dependent cancers such as breast cancer increase with prolonged use.  Attempts to wean hormone therapy should be discussed annually and particularly after three to five years of use.  Any side effects such as vaginal bleeding or pain should be reported immediately.    PT currently on compounded HRT--Biest+ DHEA+ T  Pt with atrophy, will begin Imvexxy, 2 samples 10 mcg today.  Pt with bothersome urge UI, urine studies today.  She will try kegels at home, discussed PFPT today and she will consider.  WE reviewed kegel on way to bathroom to help with urge.  Vaginal E may help as well. She would like to avoid anticholinergics as well, both her parents with dementia.  1 sample Gemtesa given today.  Call prn.        Alia Fishman MD  01/09/2025

## 2025-01-10 LAB
APPEARANCE UR: CLEAR
BACTERIA #/AREA URNS HPF: NORMAL /[HPF]
BILIRUB UR QL STRIP: NEGATIVE
COLOR UR: YELLOW
EPI CELLS #/AREA URNS HPF: NORMAL /HPF (ref 0–10)
GLUCOSE UR QL STRIP: NEGATIVE
HGB UR QL STRIP: NEGATIVE
KETONES UR QL STRIP: NEGATIVE
LEUKOCYTE ESTERASE UR QL STRIP: NEGATIVE
MICRO URNS: NORMAL
MICRO URNS: NORMAL
NITRITE UR QL STRIP: NEGATIVE
PH UR STRIP: 7 [PH] (ref 5–7.5)
PROT UR QL STRIP: NEGATIVE
RBC #/AREA URNS HPF: NORMAL /HPF (ref 0–2)
REF LAB TEST METHOD: NORMAL
RENAL EPI CELLS #/AREA URNS HPF: NORMAL /HPF
SP GR UR STRIP: 1.01 (ref 1–1.03)
SPECIMEN STATUS: NORMAL
UROBILINOGEN UR STRIP-MCNC: 0.2 MG/DL (ref 0.2–1)
WBC #/AREA URNS HPF: NORMAL /HPF (ref 0–5)

## 2025-01-11 LAB
BACTERIA UR CULT: NO GROWTH
BACTERIA UR CULT: NORMAL

## 2025-01-13 ENCOUNTER — HOSPITAL ENCOUNTER (OUTPATIENT)
Dept: MAMMOGRAPHY | Facility: HOSPITAL | Age: 60
Discharge: HOME | End: 2025-01-13
Admitting: INTERNAL MEDICINE
Payer: COMMERCIAL

## 2025-01-13 DIAGNOSIS — Z12.31 VISIT FOR SCREENING MAMMOGRAM: ICD-10-CM

## 2025-01-13 PROCEDURE — 77067 SCR MAMMO BI INCL CAD: CPT

## 2025-01-13 PROCEDURE — 77063 BREAST TOMOSYNTHESIS BI: CPT

## 2025-01-13 NOTE — PROGRESS NOTES
Please call patient about results.   Normal pap and urine studies all normal, so no infection present

## 2025-01-14 ENCOUNTER — TELEPHONE (OUTPATIENT)
Dept: OBSTETRICS AND GYNECOLOGY | Facility: CLINIC | Age: 60
End: 2025-01-14
Payer: COMMERCIAL

## 2025-01-16 ENCOUNTER — TELEPHONE (OUTPATIENT)
Dept: OBSTETRICS AND GYNECOLOGY | Facility: CLINIC | Age: 60
End: 2025-01-16
Payer: COMMERCIAL

## 2025-01-16 DIAGNOSIS — N95.2 VAGINAL ATROPHY: Primary | ICD-10-CM

## 2025-01-16 RX ORDER — ESTRADIOL 0.1 MG/G
CREAM VAGINAL
Qty: 42.5 G | Refills: 2 | Status: SHIPPED | OUTPATIENT
Start: 2025-01-16

## 2025-01-16 NOTE — TELEPHONE ENCOUNTER
[facility] pharmacy needs script clarification on Imvexxy. I called to clarify the starter kit is #18 and the maintenance kit is #8

## 2025-01-17 ENCOUNTER — OFFICE VISIT (OUTPATIENT)
Dept: INTERNAL MEDICINE | Facility: CLINIC | Age: 60
End: 2025-01-17
Payer: COMMERCIAL

## 2025-01-17 VITALS
DIASTOLIC BLOOD PRESSURE: 84 MMHG | WEIGHT: 169.6 LBS | TEMPERATURE: 98 F | BODY MASS INDEX: 25.7 KG/M2 | HEIGHT: 68 IN | OXYGEN SATURATION: 99 % | SYSTOLIC BLOOD PRESSURE: 118 MMHG | HEART RATE: 90 BPM

## 2025-01-17 DIAGNOSIS — F41.9 ANXIETY: ICD-10-CM

## 2025-01-17 DIAGNOSIS — E55.9 VITAMIN D DEFICIENCY: ICD-10-CM

## 2025-01-17 DIAGNOSIS — E53.8 LOW SERUM VITAMIN B12: ICD-10-CM

## 2025-01-17 DIAGNOSIS — M85.89 OSTEOPENIA OF MULTIPLE SITES: ICD-10-CM

## 2025-01-17 DIAGNOSIS — F41.1 GENERALIZED ANXIETY DISORDER: Chronic | ICD-10-CM

## 2025-01-17 DIAGNOSIS — E78.00 HYPERCHOLESTEROLEMIA: Chronic | ICD-10-CM

## 2025-01-17 DIAGNOSIS — Z00.00 ANNUAL PHYSICAL EXAM: Primary | ICD-10-CM

## 2025-01-17 DIAGNOSIS — E66.3 OVERWEIGHT WITH BODY MASS INDEX (BMI) OF 25 TO 25.9 IN ADULT: ICD-10-CM

## 2025-01-17 DIAGNOSIS — Z23 NEED FOR VACCINATION: ICD-10-CM

## 2025-01-17 PROCEDURE — 99396 PREV VISIT EST AGE 40-64: CPT | Performed by: INTERNAL MEDICINE

## 2025-01-17 PROCEDURE — 90715 TDAP VACCINE 7 YRS/> IM: CPT | Performed by: INTERNAL MEDICINE

## 2025-01-17 PROCEDURE — 93000 ELECTROCARDIOGRAM COMPLETE: CPT | Performed by: INTERNAL MEDICINE

## 2025-01-17 PROCEDURE — 90471 IMMUNIZATION ADMIN: CPT | Performed by: INTERNAL MEDICINE

## 2025-01-17 RX ORDER — CHOLECALCIFEROL (VITAMIN D3) 25 MCG
2000 TABLET ORAL DAILY
Start: 2025-01-17

## 2025-01-17 NOTE — PROGRESS NOTES
Preventative Annual Visit    Jocelyn Yoon  1965   [id number]    Patient Care Team:  Michelle Stone MD as PCP - General (Internal Medicine)  Casie Fuentes DPM (Podiatry)    Chief Complaint::   Chief Complaint   Patient presents with    Annual Exam    Allergies        Subjective   History of Present Illness  The patient presents for an annual physical.    She reports no specific health concerns at this time. She has recently initiated a comprehensive health improvement program, which includes dietary modifications and regular exercise, with the aim of achieving weight loss and improving her cholesterol levels. Her physical activity has been inconsistent, particularly since her senior care in June 2024. She has been focusing on increasing her protein intake, maintaining a caloric deficit, and ensuring adequate hydration. She has also incorporated a daily step count of at least 10,000 steps and bone-strengthening exercises into her routine. She has not undergone a recent bone density test due to financial constraints but is open to scheduling one in the near future. She is currently supplementing her diet with vitamin D and calcium and consumes Greek yogurt daily. She is also taking a B complex supplement. She has experienced some weight gain, which she attributes to her decreased activity level post-senior care. She has expressed interest in exploring natural supplements for cholesterol management. She has recently had a mammogram and gynecological examination, both of which were unremarkable. She typically receives her influenza vaccine in October and has received the first dose of the shingles vaccine. She has an upcoming appointment with a dermatologist in April 2025 for evaluation of skin tags and a rough spot on her skin.    FAMILY HISTORY  Her grandparents had heart disease. She does not report any family history of high cholesterol.    MEDICATIONS  Current: vitamin D, B complex,  calcium    IMMUNIZATIONS  She usually gets the influenza vaccine in October. She has received the first dose of the shingles vaccine.       Jocelyn Yoon is a 59 y.o. female who presents for an Annual Wellness Visit.    CHRONIC CONDITIONS    Patient Active Problem List   Diagnosis    History of recurrent UTIs    Generalized anxiety disorder    Bilateral bunions    Chronic foot pain, right    Perennial allergic rhinitis    Visit for gynecologic examination    Hypercholesterolemia    Osteopenia of multiple sites    Fall    Acute pain of right knee    Rib pain on left side    Right wrist pain    Overweight with body mass index (BMI) of 25 to 25.9 in adult    Vitamin D deficiency    Low serum vitamin B12        Past Medical History:   Diagnosis Date    Anxiety     Bunion     Elevated LDL cholesterol level 10/2023    History of fall     History of medical problems Dementia- both parents    History of recurrent UTIs     Osteopenia     : L1-L4 vertebrae, femoral necks bilaterally, and total hips bilaterally    Varicella 1984       Past Surgical History:   Procedure Laterality Date     SECTION      WISDOM TOOTH EXTRACTION         Family History   Problem Relation Age of Onset    Dementia Father     Prostate cancer Father     Arthritis Mother     Hypertension Mother     Dementia Mother     Heart disease Paternal Grandfather     Heart disease Maternal Grandfather     Breast cancer Neg Hx     Ovarian cancer Neg Hx     Colon cancer Neg Hx     Uterine cancer Neg Hx        Social History     Socioeconomic History    Marital status:    Tobacco Use    Smoking status: Never    Smokeless tobacco: Never   Vaping Use    Vaping status: Never Used   Substance and Sexual Activity    Alcohol use: No    Drug use: No    Sexual activity: Not Currently     Partners: Male     Birth control/protection: Post-menopausal       No Known Allergies      Current Outpatient Medications:     B Complex Vitamins (vitamin b complex)  "capsule capsule, Take  by mouth Daily., Disp: , Rfl:     Cholecalciferol 25 MCG (1000 UT) tablet, Take 2 tablets by mouth Daily., Disp: , Rfl:     Diclofenac Sodium (VOLTAREN) 1 % gel gel, Apply 4 g topically to the appropriate area as directed 4 (Four) Times a Day As Needed (joint pain)., Disp: 350 g, Rfl: 11    estradiol (ESTRACE VAGINAL) 0.1 MG/GM vaginal cream, Insert 2 gram PV QHS x 2 weeks, then insert 2 grams PV twice weekly for duration of use, Disp: 42.5 g, Rfl: 2    Magnesium Glycinate 120 MG capsule, , Disp: , Rfl:     Omega-3 Fatty Acids (fish oil) 1000 MG capsule capsule, Take  by mouth Daily With Breakfast., Disp: , Rfl:     Probiotic Product (PROBIOTIC 10 ULTRA STRENGTH PO), Take  by mouth., Disp: , Rfl:     sertraline (ZOLOFT) 50 MG tablet, Take 1 tablet by mouth Daily., Disp: 90 tablet, Rfl: 3    Zinc 30 MG capsule, , Disp: , Rfl:     Immunization History   Administered Date(s) Administered    COVID-19 (PFIZER) Purple Cap Monovalent 01/21/2021, 02/12/2021    Flu Vaccine Intradermal Quad 18-64YR 10/13/2021    Flu Vaccine Quad PF >36MO 11/16/2020    Fluzone (or Fluarix & Flulaval for VFC) >6mos 11/16/2020, 10/05/2023    Influenza Seasonal Injectable 10/13/2021    Tdap 01/17/2025        Health Maintenance Due   Topic Date Due    ZOSTER VACCINE (1 of 2) Never done    INFLUENZA VACCINE  07/01/2024    COVID-19 Vaccine (3 - 2024-25 season) 09/01/2024    COLORECTAL CANCER SCREENING  12/07/2024        Review of Systems     Vital Signs  Vitals:    01/17/25 0857   BP: 118/84   BP Location: Left arm   Patient Position: Sitting   Cuff Size: Adult   Pulse: 90   Temp: 98 °F (36.7 °C)   TempSrc: Infrared   SpO2: 99%   Weight: 76.9 kg (169 lb 9.6 oz)   Height: 172.7 cm (67.99\")   PainSc: 0-No pain     BMI is >= 25 and <30. (Overweight) The following options were offered after discussion;: weight loss educational material (shared in after visit summary), exercise counseling/recommendations, nutrition " counseling/recommendations, and Information on healthy weight added to patient's after visit summary.     Physical Exam  Vitals and nursing note reviewed.   Constitutional:       Appearance: She is well-developed and overweight.   HENT:      Head: Normocephalic.   Eyes:      Conjunctiva/sclera: Conjunctivae normal.      Pupils: Pupils are equal, round, and reactive to light.   Neck:      Thyroid: No thyromegaly.   Cardiovascular:      Rate and Rhythm: Normal rate and regular rhythm.      Heart sounds: Normal heart sounds.   Pulmonary:      Effort: Pulmonary effort is normal.      Breath sounds: Normal breath sounds. No wheezing.   Chest:   Breasts:     Right: No inverted nipple, mass, nipple discharge, skin change or tenderness.      Left: No inverted nipple, mass, nipple discharge, skin change or tenderness.   Abdominal:      General: Bowel sounds are normal.      Palpations: Abdomen is soft.      Tenderness: There is no abdominal tenderness.   Musculoskeletal:         General: No tenderness. Normal range of motion.      Cervical back: Normal range of motion and neck supple.      Right lower leg: No edema.      Left lower leg: No edema.   Lymphadenopathy:      Cervical: No cervical adenopathy.      Upper Body:      Right upper body: No supraclavicular, axillary or pectoral adenopathy.      Left upper body: No supraclavicular, axillary or pectoral adenopathy.   Skin:     General: Skin is warm and dry.      Findings: No rash.   Neurological:      Mental Status: She is alert and oriented to person, place, and time.      Cranial Nerves: No cranial nerve deficit.      Sensory: No sensory deficit.      Coordination: Coordination normal.      Gait: Gait normal.   Psychiatric:         Attention and Perception: Attention normal.         Mood and Affect: Mood normal.         Speech: Speech normal.         Behavior: Behavior normal.         Thought Content: Thought content normal.         Judgment: Judgment normal.             ECG 12 Lead    Date/Time: 1/17/2025 10:05 AM  Performed by: Michelle Stone MD    Authorized by: Michelle Stone MD  Comparison: compared with previous ECG   Similar to previous ECG  Rhythm: sinus rhythm  Rate: normal  BPM: 75  Conduction: conduction normal  ST Segments: ST segments normal  T Waves: T waves normal  QRS axis: normal  Other findings: non-specific ST-T wave changes    Clinical impression: normal ECG           Fall Risk Screen:  STEADI Fall Risk Assessment has not been completed.    Health Habits and Functional and Cognitive Screening:       No data to display                Smoking Status:  Social History     Tobacco Use   Smoking Status Never   Smokeless Tobacco Never       Alcohol Consumption:  Social History     Substance and Sexual Activity   Alcohol Use No       Depression [place]  PHQ-9:        1/17/2025     8:57 AM   PHQ-2/PHQ-9 Depression Screening   Little interest or pleasure in doing things Not at all   Feeling down, depressed, or hopeless Not at all   How difficult have these problems made it for you to do your work, take care of things at home, or get along with other people? Not difficult at all           Labs  Results for orders placed or performed in visit on 01/09/25   Comprehensive Metabolic Panel    Collection Time: 01/09/25 12:41 PM    Specimen: Blood   Result Value Ref Range    Glucose 92 65 - 99 mg/dL    BUN 16 6 - 20 mg/dL    Creatinine 0.93 0.57 - 1.00 mg/dL    Sodium 140 136 - 145 mmol/L    Potassium 4.2 3.5 - 5.2 mmol/L    Chloride 104 98 - 107 mmol/L    CO2 25.9 22.0 - 29.0 mmol/L    Calcium 9.6 8.6 - 10.5 mg/dL    Total Protein 7.2 6.0 - 8.5 g/dL    Albumin 4.3 3.5 - 5.2 g/dL    ALT (SGPT) 15 1 - 33 U/L    AST (SGOT) 21 1 - 32 U/L    Alkaline Phosphatase 69 39 - 117 U/L    Total Bilirubin 0.4 0.0 - 1.2 mg/dL    Globulin 2.9 gm/dL    A/G Ratio 1.5 g/dL    BUN/Creatinine Ratio 17.2 7.0 - 25.0    Anion Gap 10.1 5.0 - 15.0 mmol/L    eGFR 71.8 >60.0 mL/min/1.73    Lipid Panel    Collection Time: 01/09/25 12:41 PM    Specimen: Blood   Result Value Ref Range    Total Cholesterol 269 (H) 0 - 200 mg/dL    Triglycerides 83 0 - 150 mg/dL    HDL Cholesterol 78 (H) 40 - 60 mg/dL    LDL Cholesterol  177 (H) 0 - 100 mg/dL    VLDL Cholesterol 14 5 - 40 mg/dL    LDL/HDL Ratio 2.24    TSH    Collection Time: 01/09/25 12:41 PM    Specimen: Blood   Result Value Ref Range    TSH 3.100 0.270 - 4.200 uIU/mL   Vitamin B12    Collection Time: 01/09/25 12:41 PM    Specimen: Blood   Result Value Ref Range    Vitamin B-12 383 211 - 946 pg/mL   Vitamin D,25-Hydroxy    Collection Time: 01/09/25 12:41 PM    Specimen: Blood   Result Value Ref Range    25 Hydroxy, Vitamin D 42.6 30.0 - 100.0 ng/ml   CBC Auto Differential    Collection Time: 01/09/25 12:41 PM    Specimen: Blood   Result Value Ref Range    WBC 5.64 3.40 - 10.80 10*3/mm3    RBC 4.50 3.77 - 5.28 10*6/mm3    Hemoglobin 13.0 12.0 - 15.9 g/dL    Hematocrit 39.9 34.0 - 46.6 %    MCV 88.7 79.0 - 97.0 fL    MCH 28.9 26.6 - 33.0 pg    MCHC 32.6 31.5 - 35.7 g/dL    RDW 12.2 (L) 12.3 - 15.4 %    RDW-SD 39.3 37.0 - 54.0 fl    MPV 10.6 6.0 - 12.0 fL    Platelets 267 140 - 450 10*3/mm3    Neutrophil % 59.4 42.7 - 76.0 %    Lymphocyte % 27.7 19.6 - 45.3 %    Monocyte % 9.6 5.0 - 12.0 %    Eosinophil % 2.0 0.3 - 6.2 %    Basophil % 1.1 0.0 - 1.5 %    Immature Grans % 0.2 0.0 - 0.5 %    Neutrophils, Absolute 3.36 1.70 - 7.00 10*3/mm3    Lymphocytes, Absolute 1.56 0.70 - 3.10 10*3/mm3    Monocytes, Absolute 0.54 0.10 - 0.90 10*3/mm3    Eosinophils, Absolute 0.11 0.00 - 0.40 10*3/mm3    Basophils, Absolute 0.06 0.00 - 0.20 10*3/mm3    Immature Grans, Absolute 0.01 0.00 - 0.05 10*3/mm3    nRBC 0.0 0.0 - 0.2 /100 WBC      Results  Laboratory Studies  Cholesterol 177, , HDL 78. Vitamin D level 42.6. B12 level 383. Thyroid function normal. White blood cell count, red blood cell count, blood sugar, kidney function, liver enzymes all  normal.    Imaging  Bone density test: Lowest T score in the spine was -2.1 and in the femoral neck, the hip was -2.4.    Testing  EKG is normal.    Assessment & Plan     Patient Self-Management and Personalized Health Advice    The patient has been provided counseling and guidance about: diet, exercise, weight management, and prevention of cardiac or vascular disease and preventive services including:   Annual Wellness Visit (AWV).  Patient Instructions   Problem List Items Addressed This Visit          Cardiac and Vasculature    Hypercholesterolemia (Chronic)    Overview     LDL in 2022 was 168, in 10/2023 it was 147.  Normal HDL.         Relevant Orders    CBC & Differential (Completed)    Comprehensive Metabolic Panel (Completed)    Lipid Panel (Completed)    Urinalysis With Microscopic - Urine, Clean Catch    TSH (Completed)    Vitamin B12 (Completed)    ECG 12 Lead (Completed)       Endocrine and Metabolic    Vitamin D deficiency    Relevant Orders    Vitamin D,25-Hydroxy (Completed)    Low serum vitamin B12       Mental Health    Generalized anxiety disorder (Chronic)    Relevant Medications    sertraline (ZOLOFT) 50 MG tablet       Musculoskeletal and Injuries    Osteopenia of multiple sites    Overview     3/2022 DEXA showed osteopenia with the lowest T-score in the femoral neck at -2.4.  T-score of the spine was -2.1.  (Osteoporosis is -2.5 or less.)            Other    Overweight with body mass index (BMI) of 25 to 25.9 in adult     Other Visit Diagnoses       Annual physical exam    -  Primary    Need for vaccination        Relevant Orders    Tdap Vaccine Greater Than or Equal To 6yo IM (Completed)    Anxiety        Relevant Medications    sertraline (ZOLOFT) 50 MG tablet               Diagnosis Plan   1. Annual physical exam        2. Hypercholesterolemia  CBC & Differential    Comprehensive Metabolic Panel    Lipid Panel    Urinalysis With Microscopic - Urine, Clean Catch    TSH    Vitamin B12    ECG  12 Lead      3. Osteopenia of multiple sites        4. Vitamin D deficiency  Vitamin D,25-Hydroxy      5. Low serum vitamin B12        6. Overweight with body mass index (BMI) of 25 to 25.9 in adult        7. Generalized anxiety disorder        8. Need for vaccination  Tdap Vaccine Greater Than or Equal To 8yo IM      9. Anxiety  sertraline (ZOLOFT) 50 MG tablet        Outpatient Encounter Medications as of 1/17/2025   Medication Sig Dispense Refill    B Complex Vitamins (vitamin b complex) capsule capsule Take  by mouth Daily.      Cholecalciferol 25 MCG (1000 UT) tablet Take 2 tablets by mouth Daily.      Diclofenac Sodium (VOLTAREN) 1 % gel gel Apply 4 g topically to the appropriate area as directed 4 (Four) Times a Day As Needed (joint pain). 350 g 11    estradiol (ESTRACE VAGINAL) 0.1 MG/GM vaginal cream Insert 2 gram PV QHS x 2 weeks, then insert 2 grams PV twice weekly for duration of use 42.5 g 2    Magnesium Glycinate 120 MG capsule       Omega-3 Fatty Acids (fish oil) 1000 MG capsule capsule Take  by mouth Daily With Breakfast.      Probiotic Product (PROBIOTIC 10 ULTRA STRENGTH PO) Take  by mouth.      sertraline (ZOLOFT) 50 MG tablet Take 1 tablet by mouth Daily. 90 tablet 3    Zinc 30 MG capsule       [DISCONTINUED] Cholecalciferol 25 MCG (1000 UT) tablet Take 1 tablet by mouth Daily.      [DISCONTINUED] sertraline (ZOLOFT) 50 MG tablet Take 1 tablet by mouth Daily. 90 tablet 1    [DISCONTINUED] amoxicillin-clavulanate (AUGMENTIN) 875-125 MG per tablet Take 1 tablet by mouth 2 (Two) Times a Day. 20 tablet 0    [DISCONTINUED] benzonatate (TESSALON) 200 MG capsule Take 1 capsule by mouth 2 (Two) Times a Day As Needed for Cough. 30 capsule 0    [DISCONTINUED] cyclobenzaprine (FLEXERIL) 10 MG tablet Take 1 tablet by mouth 2 (Two) Times a Day As Needed for Muscle Spasms. 30 tablet 0    [DISCONTINUED] ibuprofen (ADVIL,MOTRIN) 600 MG tablet Take 1 tablet by mouth Every 8 (Eight) Hours As Needed for Mild Pain.  "30 tablet 0    [DISCONTINUED] Loratadine (CLARITIN PO) Take  by mouth Daily.      [DISCONTINUED] Progesterone Micronized (PROGESTERONE COMPOUNDING KIT TD) Place  on the skin as directed by provider. Progesterone DHEA Pregnenolone Bi-Est   50/15/5/0.5 ml cream       No facility-administered encounter medications on file as of 1/17/2025.         Age appropriate preventive counseling done including age appropriate vaccines,regular  Mammogram and self breast exam, pap smear, colonoscopy, regular dental visits, mental health, injury prevention such as wearing seat belt and preventing falls, healthy  nutrition, healthy weight, regular physical exercise. Alcohol use is moderate.  Tobacco history-none. Drug use-none.  STD's-not at risk.    Objective   Vital Signs:  /84 (BP Location: Left arm, Patient Position: Sitting, Cuff Size: Adult)   Pulse 90   Temp 98 °F (36.7 °C) (Infrared)   Ht 172.7 cm (67.99\")   Wt 76.9 kg (169 lb 9.6 oz)   SpO2 99%   BMI 25.79 kg/m²     [unfilled]    The following data was reviewed by: Michelle Stone MD on 01/17/2025:  CMP          1/9/2025    12:41   CMP   Glucose 92    BUN 16    Creatinine 0.93    EGFR 71.8    Sodium 140    Potassium 4.2    Chloride 104    Calcium 9.6    Total Protein 7.2    Albumin 4.3    Globulin 2.9    Total Bilirubin 0.4    Alkaline Phosphatase 69    AST (SGOT) 21    ALT (SGPT) 15    Albumin/Globulin Ratio 1.5    BUN/Creatinine Ratio 17.2    Anion Gap 10.1      CBC          1/9/2025    12:41   CBC   WBC 5.64    RBC 4.50    Hemoglobin 13.0    Hematocrit 39.9    MCV 88.7    MCH 28.9    MCHC 32.6    RDW 12.2    Platelets 267      CBC w/diff          1/9/2025    12:41   CBC w/Diff   WBC 5.64    RBC 4.50    Hemoglobin 13.0    Hematocrit 39.9    MCV 88.7    MCH 28.9    MCHC 32.6    RDW 12.2    Platelets 267    Neutrophil Rel % 59.4    Immature Granulocyte Rel % 0.2    Lymphocyte Rel % 27.7    Monocyte Rel % 9.6    Eosinophil Rel % 2.0    Basophil Rel % 1.1  "     Lipid Panel          1/9/2025    12:41   Lipid Panel   Total Cholesterol 269    Triglycerides 83    HDL Cholesterol 78    VLDL Cholesterol 14    LDL Cholesterol  177    LDL/HDL Ratio 2.24      TSH          1/9/2025    12:41   TSH   TSH 3.100           Assessment and Plan   Diagnoses and all orders for this visit:    1. Annual physical exam (Primary)    2. Hypercholesterolemia  -     CBC & Differential; Future  -     Comprehensive Metabolic Panel; Future  -     Lipid Panel; Future  -     Urinalysis With Microscopic - Urine, Clean Catch; Future  -     TSH; Future  -     Vitamin B12; Future  -     ECG 12 Lead    3. Osteopenia of multiple sites    4. Vitamin D deficiency  -     Vitamin D,25-Hydroxy; Future    5. Low serum vitamin B12    6. Overweight with body mass index (BMI) of 25 to 25.9 in adult    7. Generalized anxiety disorder    8. Need for vaccination  -     Tdap Vaccine Greater Than or Equal To 6yo IM    9. Anxiety  -     sertraline (ZOLOFT) 50 MG tablet; Take 1 tablet by mouth Daily.  Dispense: 90 tablet; Refill: 3    Other orders  -     Cholecalciferol 25 MCG (1000 UT) tablet; Take 2 tablets by mouth Daily.      Assessment & Plan  1. Hypercholesterolemia.  Her total cholesterol has increased, with LDL rising from 147 to 177. HDL is good at 78. She is starting the LEAN program with [facility] and will increase physical activity. Weight loss is also recommended. She will weigh herself weekly at home to monitor progress. Fasting lipids will be rechecked in 4 months. If LDL does not decrease to less than 100, cholesterol medication may be necessary.    2. Osteopenia.  The lowest T-score in the femoral neck in 2022 was -2.4, close to the osteoporosis range. She is increasing physical activity and weight-bearing exercises. Vitamin D3 dose will be increased to 2000 units daily to aim for a level around 50. More dietary calcium is advised, and she should take a calcium tablet or chew or gummy of 500-600 mg  daily. She will call [facility] to check the cost of a bone density test there and inform us if she would like me to reorder it there.    3. Vitamin D deficiency.  Her vitamin D level has improved from 31 to 42.6 but is still below the target of 50. Vitamin D3 dose will be increased to 2000 units daily.    4. Low B12.  B12 level is 383, within the normal range but on the lower end. She will either increase her B complex capsule to twice daily or take two of them once daily. Alternatively, she could buy a B12 1000 mcg tablet and take one daily.    5. Overweight.  BMI 25  BMI is 25. She has already lost 3 pounds since early January 2025. She is starting the LEAN program and increasing exercise. Weekly home weighing is recommended to monitor progress.    Anxiety  Continue sertraline daily.  Exercise and physical activity also helps decrease symptoms of stress, anxiety, and mood.    6. Health Maintenance.  She will receive the Tdap vaccine today. She is advised to get the influenza vaccine at the pharmacy in a couple of weeks. She had a mammogram last week, and her Pap smears have been normal. She has an appointment with a dermatologist in April 2025 to check skin tags and a rough spot.    Follow-up  The patient will follow up in 4 months.          Follow Up   Return in about 4 months (around 5/17/2025) for recheck fasting.  Patient was given instructions and counseling regarding her condition or for health maintenance advice. Please see specific information pulled into the AVS if appropriate.     Note: Part of this note may be an electronic transcription/translation of spoken language to printed text using the Dragon Dictation System.     Michelle Stone MD  Patient or patient representative verbalized consent for the use of Ambient Listening during the visit with  Michelle Stone MD for chart documentation. 1/20/2025  10:13 EST

## 2025-01-17 NOTE — LETTER
[facility]  Vaccine Consent Form    Patient Name:  Jocelyn Yoon  Patient :  1965  [id number]      Screening Checklist  The following questions should be completed prior to vaccination. If you answer “yes” to any question, it does not necessarily mean you should not be vaccinated. It just means we may need to clarify or ask more questions. If a question is unclear, please ask your healthcare provider to explain it.    Yes No   Any fever or moderate to severe illness today (mild illness and/or antibiotic treatment are not contraindications)?     Do you have a history of a serious reaction to any previous vaccinations, such as anaphylaxis, encephalopathy within 7 days, Guillain-[place] syndrome within 6 weeks, seizure?     Have you received any live vaccine(s) (e.g MMR, DESTINEE) or any other vaccines in the last month (to ensure duplicate doses aren't given)?     Do you have an anaphylactic allergy to latex (DTaP, DTaP-IPV, Hep A, Hep B, MenB, RV, Td, Tdap), baker’s yeast (Hep B, HPV), polysorbates (RSV, nirsevimab, PCV 20, Rotavirrus, Tdap, Shingrix), or gelatin (DESTINEE, MMR)?     Do you have an anaphylactic allergy to neomycin (Rabies, DESTINEE, MMR, IPV, Hep A), polymyxin B (IPV), or streptomycin (IPV)?      Any cancer, leukemia, AIDS, or other immune system disorder? (DESTINEE, MMR, RV)     Do you have a parent, brother, or sister with an immune system problem (if immune competence of vaccine recipient clinically verified, can proceed)? (MMR, DESTINEE)     Any recent steroid treatments for >2 weeks, chemotherapy, or radiation treatment? (DESTINEE, MMR)     Have you received antibody-containing blood transfusions or IVIG in the past 11 months (recommended interval is dependent on product)? (MMR, DESTINEE)     Have you taken antiviral drugs (acyclovir, famciclovir, valacyclovir for DESTINEE) in the last 24 or 48 hours, respectively?      Are you pregnant or planning to become pregnant within 1 month? (DESTINEE, MMR, HPV, IPV, MenB, Abrexvy; For Hep  "B- refer to Engerix-B; For RSV - Abrysvo is indicated for 32-36 weeks of pregnancy from September to January)     For infants, have you ever been told your child has had intussusception or a medical emergency involving obstruction of the intestine (Rotavirus)? If not for an infant, can skip this question.         *Ordering Physicians/APC should be consulted if \"yes\" is checked by the patient or guardian above.  I have received, read, and understand the Vaccine Information Statement (VIS) for each vaccine ordered.  I have considered my or my child's health status as well as the health status of my close contacts.  I have taken the opportunity to discuss my vaccine questions with my or my child's health care provider.   I have requested that the ordered vaccine(s) be given to me or my child.  I understand the benefits and risks of the vaccines.  I understand that I should remain in the clinic for 15 minutes after receiving the vaccine(s).  _________________________________________________________  Signature of Patient or Parent/Legal Guardian ____________________  Date     "

## 2025-01-17 NOTE — PATIENT INSTRUCTIONS
Patient Instructions   Problem List Items Addressed This Visit          Cardiac and Vasculature    Hypercholesterolemia (Chronic)    Overview     LDL in 2022 was 168, in 10/2023 it was 147.  Normal HDL.         Relevant Orders    CBC & Differential (Completed)    Comprehensive Metabolic Panel (Completed)    Lipid Panel (Completed)    Urinalysis With Microscopic - Urine, Clean Catch    TSH (Completed)    Vitamin B12 (Completed)    ECG 12 Lead       Endocrine and Metabolic    Vitamin D deficiency    Relevant Orders    Vitamin D,25-Hydroxy (Completed)    Low serum vitamin B12       Mental Health    Generalized anxiety disorder (Chronic)    Relevant Medications    sertraline (ZOLOFT) 50 MG tablet       Musculoskeletal and Injuries    Osteopenia of multiple sites    Overview     3/2022 DEXA showed osteopenia with the lowest T-score in the femoral neck at -2.4.  T-score of the spine was -2.1.  (Osteoporosis is -2.5 or less.)            Other    Overweight with body mass index (BMI) of 25 to 25.9 in adult     Other Visit Diagnoses       Annual physical exam    -  Primary    Need for vaccination        Relevant Orders    Tdap Vaccine Greater Than or Equal To 8yo IM (Completed)            Exercising to Stay Healthy  To become healthy and stay healthy, it is recommended that you do moderate-intensity and vigorous-intensity exercise. You can tell that you are exercising at a moderate intensity if your heart starts beating faster and you start breathing faster but can still hold a conversation. You can tell that you are exercising at a vigorous intensity if you are breathing much harder and faster and cannot hold a conversation while exercising.  How can exercise benefit me?  Exercising regularly is important. It has many health benefits, such as:  Improving overall fitness, flexibility, and endurance.  Increasing bone density.  Helping with weight control.  Decreasing body fat.  Increasing muscle strength and  endurance.  Reducing stress and tension, anxiety, depression, or anger.  Improving overall health.  What guidelines should I follow while exercising?  Before you start a new exercise program, talk with your health care provider.  Do not exercise so much that you hurt yourself, feel dizzy, or get very short of breath.  Wear comfortable clothes and wear shoes with good support.  Drink plenty of water while you exercise to prevent dehydration or heat stroke.  Work out until your breathing and your heartbeat get faster (moderate intensity).  How often should I exercise?  Choose an activity that you enjoy, and set realistic goals. Your health care provider can help you make an activity plan that is individually designed and works best for you.  Exercise regularly as told by your health care provider. This may include:  Doing strength training two times a week, such as:  Lifting weights.  Using resistance bands.  Push-ups.  Sit-ups.  Yoga.  Doing a certain intensity of exercise for a given amount of time. Choose from these options:  A total of 150 minutes of moderate-intensity exercise every week.  A total of 75 minutes of vigorous-intensity exercise every week.  A mix of moderate-intensity and vigorous-intensity exercise every week.  Children, pregnant women, people who have not exercised regularly, people who are overweight, and older adults may need to talk with a health care provider about what activities are safe to perform. If you have a medical condition, be sure to talk with your health care provider before you start a new exercise program.  What are some exercise ideas?  Moderate-intensity exercise ideas include:  Walking 1 mile (1.6 km) in about 15 minutes.  Biking.  Hiking.  Golfing.  Dancing.  Water aerobics.  Vigorous-intensity exercise ideas include:  Walking 4.5 miles (7.2 km) or more in about 1 hour.  Jogging or running 5 miles (8 km) in about 1 hour.  Biking 10 miles (16.1 km) or more in about 1  hour.  Lap swimming.  Roller-skating or in-line skating.  Cross-country skiing.  Vigorous competitive sports, such as football, basketball, and soccer.  Jumping rope.  Aerobic dancing.  What are some everyday activities that can help me get exercise?  Yard work, such as:  Pushing a .  Raking and bagging leaves.  Washing your car.  Pushing a stroller.  Shoveling snow.  Gardening.  Washing windows or floors.  How can I be more active in my day-to-day activities?  Use stairs instead of an elevator.  Take a walk during your lunch break.  If you drive, park your car farther away from your work or school.  If you take public transportation, get off one stop early and walk the rest of the way.  Stand up or walk around during all of your indoor phone calls.  Get up, stretch, and walk around every 30 minutes throughout the day.  Enjoy exercise with a friend. Support to continue exercising will help you keep a regular routine of activity.  Where to find more information  You can find more information about exercising to stay healthy from:  [place] Department of Health and Human Services: www.hhs.gov  Centers for Disease Control and Prevention (CDC): www.cdc.gov  Summary  Exercising regularly is important. It will improve your overall fitness, flexibility, and endurance.  Regular exercise will also improve your overall health. It can help you control your weight, reduce stress, and improve your bone density.  Do not exercise so much that you hurt yourself, feel dizzy, or get very short of breath.  Before you start a new exercise program, talk with your health care provider.  This information is not intended to replace advice given to you by your health care provider. Make sure you discuss any questions you have with your health care provider.  Document Revised: 04/15/2022 Document Reviewed: 04/15/2022  Elsevier Patient Education © 2023 Else[employer] Inc. BMI for Adults  Body mass index (BMI) is a number found using a  "person's weight and height. BMI can help tell how much of a person's weight is made up of fat. BMI does not measure body fat directly. It is used instead of tests that directly measure body fat, which can be difficult and expensive.  What are BMI measurements used for?  BMI is useful to:  Find out if your weight puts you at higher risk for medical problems.  Help recommend changes, such as in diet and exercise. This can help you reach a healthy weight. BMI screening can be done again to see if these changes are working.  How is BMI calculated?  Your height and weight are measured. The BMI is found from those numbers. This can be done with [place] or metric measurements. Note that charts and online BMI calculators are available to help you find your BMI quickly and easily without doing these calculations.  To calculate your BMI in [place] measurements:  Measure your weight in pounds (lb).  Multiply the number of pounds by 703.  So, for an adult who weighs 150 lb, multiply that number by 703: 150 x 703, which equals 105,450.  Measure your height in inches. Then multiply that number by itself to get a measurement called \"inches squared.\"  So, for an adult who is 70 inches tall, the \"inches squared\" measurement is 70 inches x 70 inches, which equals 4,900 inches squared.  Divide the total from step 2 (number of lb x 703) by the total from step 3 (inches squared): 105,450 ÷ 4,900 = 21.5. This is your BMI.  To calculate your BMI in metric measurements:    Measure your weight in kilograms (kg).  For this example, the weight is 70 kg.  Measure your height in meters (m). Then multiply that number by itself to get a measurement called \"meters squared.\"  So, for an adult who is 1.75 m tall, the \"meters squared\" measurement is 1.75 m x 1.75 m, which equals 3.1 meters squared.  Divide the number of kilograms (your weight) by the meters squared number. In this example: 70 ÷ 3.1 = 22.6. This is your BMI.  What do the results mean?  BMI " charts are used to see if you are underweight, normal weight, overweight, or obese. The following guidelines will be used:  Underweight: BMI less than 18.5.  Normal weight: BMI between 18.5 and 24.9.  Overweight: BMI between 25 and 29.9.  Obese: BMI of 30 or above.  BMI is a tool and cannot diagnose a condition. Talk with your health care provider about what your BMI means for you. Keep these notes in mind:  Weight includes fat and muscle. Someone with a muscular build, such as an athlete, may have a BMI that is higher than 24.9. In cases like these, BMI is not a correct measure of body fat.  If you have a BMI of 25 or higher, your provider may need to do more testing to find out if excess body fat is the cause.  BMI is measured the same way for males and females. Females usually have more body fat than males of the same height and weight.  Where to find more information  For more information about BMI, including tools to quickly find your BMI, go to:  Centers for Disease Control and Prevention: cdc.gov  American Heart Association: heart.org  National Heart, Lung, and Blood [place]: nhlbi.nih.gov  This information is not intended to replace advice given to you by your health care provider. Make sure you discuss any questions you have with your health care provider.  Document Revised: 09/07/2023 Document Reviewed: 08/31/2023  [employer] Patient Education © 2024 [employer]Heart-Healthy Eating Plan  Many factors influence your heart (coronary) health, including eating and exercise habits. Coronary risk increases with abnormal blood fat (lipid) levels. Heart-healthy meal planning includes limiting unhealthy fats, increasing healthy fats, and making other diet and lifestyle changes.  What is my plan?  Your health care provider may recommend that you:  Limit your fat intake to _________% or less of your total calories each day.  Limit your saturated fat intake to _________% or less of your total calories each  day.  Limit the amount of cholesterol in your diet to less than _________ mg per day.  What are tips for following this plan?  Cooking  Cook foods using methods other than frying. Baking, boiling, grilling, and broiling are all good options. Other ways to reduce fat include:  Removing the skin from poultry.  Removing all visible fats from meats.  Steaming vegetables in water or broth.  Meal planning  A plate with examples of foods in a healthy diet.      At meals, imagine dividing your plate into fourths:  Fill one-half of your plate with vegetables and green salads.  Fill one-fourth of your plate with whole grains.  Fill one-fourth of your plate with lean protein foods.  Eat 4-5 servings of vegetables per day. One serving equals 1 cup raw or cooked vegetable, or 2 cups raw leafy greens.  Eat 4-5 servings of fruit per day. One serving equals 1 medium whole fruit, ¼ cup dried fruit, ½ cup fresh, frozen, or canned fruit, or ½ cup 100% fruit juice.  Eat more foods that contain soluble fiber. Examples include apples, broccoli, carrots, beans, peas, and barley. Aim to get 25-30 g of fiber per day.  Increase your consumption of legumes, nuts, and seeds to 4-5 servings per week. One serving of dried beans or legumes equals ½ cup cooked, 1 serving of nuts is ¼ cup, and 1 serving of seeds equals 1 tablespoon.  Fats  Choose healthy fats more often. Choose monounsaturated and polyunsaturated fats, such as olive and canola oils, flaxseeds, walnuts, almonds, and seeds.  Eat more omega-3 fats. Choose salmon, mackerel, sardines, tuna, flaxseed oil, and ground flaxseeds. Aim to eat fish at least 2 times each week.  Check food labels carefully to identify foods with trans fats or high amounts of saturated fat.  Limit saturated fats. These are found in animal products, such as meats, butter, and cream. Plant sources of saturated fats include palm oil, palm kernel oil, and coconut oil.  Avoid foods with partially hydrogenated oils in  them. These contain trans fats. Examples are stick margarine, some tub margarines, cookies, crackers, and other baked goods.  Avoid fried foods.  General information  Eat more home-cooked food and less restaurant, buffet, and fast food.  Limit or avoid alcohol.  Limit foods that are high in starch and sugar.  Lose weight if you are overweight. Losing just 5-10% of your body weight can help your overall health and prevent diseases such as diabetes and heart disease.  Monitor your salt (sodium) intake, especially if you have high blood pressure. Talk with your health care provider about your sodium intake.  Try to incorporate more vegetarian meals weekly.  What foods can I eat?  Fruits  All fresh, canned (in natural juice), or frozen fruits.  Vegetables  Fresh or frozen vegetables (raw, steamed, roasted, or grilled). Green salads.  Grains  Most grains. Choose whole wheat and whole grains most of the time. Rice and pasta, including brown rice and pastas made with whole wheat.  Meats and other proteins  Lean, well-trimmed beef, veal, pork, and lamb. Chicken and turkey without skin. All fish and shellfish. Wild duck, rabbit, pheasant, and venison. Egg whites or low-cholesterol egg substitutes. Dried beans, peas, lentils, and tofu. Seeds and most nuts.  Dairy  Low-fat or nonfat cheeses, including ricotta and mozzarella. Skim or 1% milk (liquid, powdered, or evaporated). Buttermilk made with low-fat milk. Nonfat or low-fat yogurt.  Fats and oils  Non-hydrogenated (trans-free) margarines. Vegetable oils, including soybean, sesame, sunflower, olive, peanut, safflower, corn, canola, and cottonseed. Salad dressings or mayonnaise made with a vegetable oil.  Beverages  Water (mineral or sparkling). Coffee and tea. Diet carbonated beverages.  Sweets and desserts  Sherbet, gelatin, and fruit ice. Small amounts of dark chocolate.  Limit all sweets and desserts.  Seasonings and condiments  All seasonings and condiments.  The items  listed above may not be a complete list of foods and beverages you can eat. Contact a dietitian for more options.  What foods are not recommended?  Fruits  Canned fruit in heavy syrup. Fruit in cream or butter sauce. Fried fruit. Limit coconut.  Vegetables  Vegetables cooked in cheese, cream, or butter sauce. Fried vegetables.  Grains  Breads made with saturated or trans fats, oils, or whole milk. Croissants. Sweet rolls. Donuts. High-fat crackers, such as cheese crackers.  Meats and other proteins  Fatty meats, such as hot dogs, ribs, sausage, toth, rib-eye roast or steak. High-fat deli meats, such as salami and bologna. Caviar. Domestic duck and goose. Organ meats, such as liver.  Dairy  Cream, sour cream, cream cheese, and creamed cottage cheese. Whole-milk cheeses. Whole or 2% milk (liquid, evaporated, or condensed). Whole buttermilk. Cream sauce or high-fat cheese sauce. Whole-milk yogurt.  Fats and oils  Meat fat, or shortening. Cocoa butter, hydrogenated oils, palm oil, coconut oil, palm kernel oil. Solid fats and shortenings, including toth fat, salt pork, lard, and butter. Nondairy cream substitutes. Salad dressings with cheese or sour cream.  Beverages  Regular sodas and any drinks with added sugar.  Sweets and desserts  Frosting. Pudding. Cookies. Cakes. Pies. Milk chocolate or white chocolate. Buttered syrups. Full-fat ice cream or ice cream drinks.  The items listed above may not be a complete list of foods and beverages to avoid. Contact a dietitian for more information.  Summary  Heart-healthy meal planning includes limiting unhealthy fats, increasing healthy fats, and making other diet and lifestyle changes.  Lose weight if you are overweight. Losing just 5-10% of your body weight can help your overall health and prevent diseases such as diabetes and heart disease.  Focus on eating a balance of foods, including fruits and vegetables, low-fat or nonfat dairy, lean protein, nuts and legumes, whole  grains, and heart-healthy oils and fats.  This information is not intended to replace advice given to you by your health care provider. Make sure you discuss any questions you have with your health care provider.  Document Revised: 04/28/2022 Document Reviewed: 04/28/2022  Elsevier Patient Education © 2022 [employer] Inc[employer]

## 2025-04-16 ENCOUNTER — OFFICE VISIT (OUTPATIENT)
Dept: INTERNAL MEDICINE | Facility: CLINIC | Age: 60
End: 2025-04-16
Payer: COMMERCIAL

## 2025-04-16 VITALS
BODY MASS INDEX: 24.25 KG/M2 | TEMPERATURE: 97.8 F | HEIGHT: 68 IN | SYSTOLIC BLOOD PRESSURE: 124 MMHG | WEIGHT: 160 LBS | OXYGEN SATURATION: 99 % | HEART RATE: 82 BPM | DIASTOLIC BLOOD PRESSURE: 78 MMHG

## 2025-04-16 DIAGNOSIS — H10.502 BLEPHAROCONJUNCTIVITIS OF LEFT EYE, UNSPECIFIED BLEPHAROCONJUNCTIVITIS TYPE: Primary | ICD-10-CM

## 2025-04-16 PROBLEM — H01.025 SQUAMOUS BLEPHARITIS OF LEFT LOWER EYELID: Status: ACTIVE | Noted: 2025-04-16

## 2025-04-16 PROCEDURE — 99214 OFFICE O/P EST MOD 30 MIN: CPT | Performed by: INTERNAL MEDICINE

## 2025-04-16 RX ORDER — TOBRAMYCIN AND DEXAMETHASONE 3; 1 MG/ML; MG/ML
1 SUSPENSION/ DROPS OPHTHALMIC 4 TIMES DAILY
Qty: 2.5 ML | Refills: 0 | Status: SHIPPED | OUTPATIENT
Start: 2025-04-16

## 2025-04-16 NOTE — PATIENT INSTRUCTIONS
Patient Instructions  Problem List Items Addressed This Visit          Eye    Blepharoconjunctivitis of left eye - Primary    Relevant Medications    tobramycin-dexAMETHasone (TobraDex) 0.3-0.1 % ophthalmic suspension

## 2025-04-16 NOTE — PROGRESS NOTES
[facility]     Jocelyn Yoon  1965   [id number]      Patient Care Team:  Michelle Stone MD as PCP - General (Internal Medicine)  Casie Fuentes DPM (Podiatry)    Chief Complaint   Patient presents with    Eye Pain        Patient is a 59 y.o. female.   History of Present Illness  The patient presents for an acute visit with complaints of left eye discomfort.    Over the past few days, a sensation of swelling in the left eye has been noted, along with pale yellowish discharge observed this morning. Tenderness in the area was experienced yesterday. There is no significant pain in the eyeball itself, except when pressure is applied. No recent cold or sinus symptoms are reported. Vaseline has been applied to a lesion on the nose, which was treated with liquid nitrogen by a dermatologist on 04/10/2025. There is no associated dental pain, head congestion, or ear discomfort. No foreign body is recalled entering the eye, and there is no sensation of a foreign object in the eye. Pets are present at home, but no recent scratches have occurred. There are no known allergies to antibiotics, and no recollection of using antibiotic eyedrops previously. Regular vision checks are conducted by an optometrist, with no issues reported in the right eye.         CHRONIC CONDITIONS      Past Medical History:   Diagnosis Date    Anxiety     Bunion     Elevated LDL cholesterol level 10/2023    History of fall     History of medical problems Dementia- both parents    History of recurrent UTIs     Osteopenia     : L1-L4 vertebrae, femoral necks bilaterally, and total hips bilaterally    Varicella 1984       Past Surgical History:   Procedure Laterality Date     SECTION  2001    WISDOM TOOTH EXTRACTION         Family History   Problem Relation Age of Onset    Dementia Father     Prostate cancer Father     Arthritis Mother     Hypertension Mother     Dementia Mother     Heart disease Paternal Grandfather   "   Heart disease Maternal Grandfather     Breast cancer Neg Hx     Ovarian cancer Neg Hx     Colon cancer Neg Hx     Uterine cancer Neg Hx        Social History     Socioeconomic History    Marital status:    Tobacco Use    Smoking status: Never    Smokeless tobacco: Never   Vaping Use    Vaping status: Never Used   Substance and Sexual Activity    Alcohol use: No    Drug use: No    Sexual activity: Not Currently     Partners: Male     Birth control/protection: Post-menopausal       No Known Allergies    Review of Systems:     Review of Systems    Vital Signs  Vitals:    04/16/25 1509   BP: 124/78   BP Location: Left arm   Patient Position: Sitting   Cuff Size: Adult   Pulse: 82   Temp: 97.8 °F (36.6 °C)   TempSrc: Infrared   SpO2: 99%   Weight: 72.6 kg (160 lb)   Height: 172.7 cm (67.99\")   PainSc: 0-No pain     Body mass index is 24.33 kg/m².  BMI is within normal parameters. No other follow-up for BMI required.          Current Outpatient Medications:     B Complex Vitamins (vitamin b complex) capsule capsule, Take  by mouth Daily., Disp: , Rfl:     Cholecalciferol 25 MCG (1000 UT) tablet, Take 2 tablets by mouth Daily., Disp: , Rfl:     Diclofenac Sodium (VOLTAREN) 1 % gel gel, Apply 4 g topically to the appropriate area as directed 4 (Four) Times a Day As Needed (joint pain)., Disp: 350 g, Rfl: 11    estradiol (ESTRACE VAGINAL) 0.1 MG/GM vaginal cream, Insert 2 gram PV QHS x 2 weeks, then insert 2 grams PV twice weekly for duration of use, Disp: 42.5 g, Rfl: 2    Magnesium Glycinate 120 MG capsule, , Disp: , Rfl:     Omega-3 Fatty Acids (fish oil) 1000 MG capsule capsule, Take  by mouth Daily With Breakfast., Disp: , Rfl:     Probiotic Product (PROBIOTIC 10 ULTRA STRENGTH PO), Take  by mouth., Disp: , Rfl:     sertraline (ZOLOFT) 50 MG tablet, Take 1 tablet by mouth Daily., Disp: 90 tablet, Rfl: 3    Zinc 30 MG capsule, , Disp: , Rfl:     tobramycin-dexAMETHasone (TobraDex) 0.3-0.1 % ophthalmic " suspension, Administer 1 drop into the left eye 4 (Four) Times a Day., Disp: 2.5 mL, Rfl: 0    Physical Exam:    Physical Exam  Vitals and nursing note reviewed.   Constitutional:       Appearance: She is well-developed.   HENT:      Head: Normocephalic.      Right Ear: Tympanic membrane and ear canal normal.   Eyes:      General: Vision grossly intact. No scleral icterus.        Left eye: Discharge present.No foreign body.      Extraocular Movements: Extraocular movements intact.      Conjunctiva/sclera: Conjunctivae normal.      Left eye: Left conjunctiva is injected.      Pupils: Pupils are equal, round, and reactive to light.        Comments: Swelling and erythema around the left eye mainly involving the lower lid.  There is also some injection of the left lateral conjunctiva.  There is a small amount of clear crusting below the left eye.   Neck:      Thyroid: No thyromegaly.   Cardiovascular:      Rate and Rhythm: Normal rate and regular rhythm.      Heart sounds: Normal heart sounds.   Pulmonary:      Effort: Pulmonary effort is normal.      Breath sounds: Normal breath sounds. No wheezing.   Musculoskeletal:         General: Normal range of motion.      Cervical back: Normal range of motion and neck supple.   Lymphadenopathy:      Cervical: No cervical adenopathy.   Skin:     General: Skin is warm and dry.   Neurological:      Mental Status: She is alert and oriented to person, place, and time.   Psychiatric:         Thought Content: Thought content normal.          ACE III MINI        Results Review:    None  Results         CMP:  Lab Results   Component Value Date    Glucose 92 01/09/2025    Glucose, UA Negative 01/09/2025    Glucose, UA Negative 10/05/2023    BUN 16 01/09/2025    BUN/Creatinine Ratio 17.2 01/09/2025    Creatinine 0.93 01/09/2025    Ketones Negative 01/09/2025    Ketones, UA Trace (A) 10/05/2023    CO2 25.9 01/09/2025    Calcium 9.6 01/09/2025    Albumin 4.3 01/09/2025    AST (SGOT) 21  "01/09/2025    ALT (SGPT) 15 01/09/2025     HbA1c:  No results found for: \"HGBA1C\"  Microalbumin:  No results found for: \"MICROALBUR\", \"POCMALB\", \"POCCREAT\"  Lipid Panel  Lab Results   Component Value Date    CHOL 269 (H) 01/09/2025    TRIG 83 01/09/2025    HDL 78 (H) 01/09/2025     (H) 01/09/2025    AST 21 01/09/2025    ALT 15 01/09/2025       Medication Review: Medications reviewed and noted  Patient Instructions   Problem List Items Addressed This Visit          Eye    Blepharoconjunctivitis of left eye - Primary    Relevant Medications    tobramycin-dexAMETHasone (TobraDex) 0.3-0.1 % ophthalmic suspension          Diagnosis Plan   1. Blepharoconjunctivitis of left eye, unspecified blepharoconjunctivitis type          Assessment & Plan  Left blepharoconjunctivitis.  Tenderness and swelling around the left eye, with pale yellowish discharge noted in the morning.  - TobraDex (tobramycin/dexamethasone) ophthalmic suspension prescribed, 1 drop in the left eye 4 times a day.  Eyedrops should be used for at least 5 days, and may be discontinued if the condition improves.  - Warm compresses should be applied repeatedly throughout the day over the next 2 days.   - She will let us know right away if the eye becomes more painful or the area of pain enlarges. Tylenol may be taken as needed for pain.      Follow-up  - Follow-up appointment scheduled for 05/16/2025.         Plan of care reviewed with patient at the conclusion of today's visit. Education was provided regarding diagnosis, management, and any prescribed or recommended OTC medications. Patient verbalizes understanding of and agreement with management plan.         04/16/25   15:11 EDT    Patient or patient representative verbalized consent for the use of Ambient Listening during the visit with  Michelle Stone MD for chart documentation. 4/16/2025  15:31 EDT        "